# Patient Record
Sex: MALE | Race: WHITE | Employment: OTHER | URBAN - METROPOLITAN AREA
[De-identification: names, ages, dates, MRNs, and addresses within clinical notes are randomized per-mention and may not be internally consistent; named-entity substitution may affect disease eponyms.]

---

## 2017-03-09 PROBLEM — I10 ESSENTIAL HYPERTENSION: Status: ACTIVE | Noted: 2017-03-09

## 2017-04-03 PROBLEM — I42.1 HOCM (HYPERTROPHIC OBSTRUCTIVE CARDIOMYOPATHY) (HCC): Status: ACTIVE | Noted: 2017-04-03

## 2018-02-12 PROBLEM — R06.09 DYSPNEA ON EXERTION: Status: ACTIVE | Noted: 2018-02-12

## 2018-04-19 ENCOUNTER — HOSPITAL ENCOUNTER (OUTPATIENT)
Dept: LAB | Age: 68
Discharge: HOME | End: 2018-04-19
Payer: MEDICARE

## 2018-04-19 DIAGNOSIS — I42.1 HOCM (HYPERTROPHIC OBSTRUCTIVE CARDIOMYOPATHY) (HCC): ICD-10-CM

## 2018-04-19 LAB
ANION GAP SERPL CALC-SCNC: 9 MMOL/L (ref 7–16)
BASOPHILS # BLD: 0 K/UL (ref 0–0.2)
BASOPHILS NFR BLD: 1 % (ref 0–2)
BNP SERPL-MCNC: 17 PG/ML
BUN SERPL-MCNC: 17 MG/DL (ref 8–23)
CALCIUM SERPL-MCNC: 9.8 MG/DL (ref 8.3–10.4)
CHLORIDE SERPL-SCNC: 102 MMOL/L (ref 98–107)
CO2 SERPL-SCNC: 27 MMOL/L (ref 21–32)
CREAT SERPL-MCNC: 1 MG/DL (ref 0.8–1.5)
DIFFERENTIAL METHOD BLD: NORMAL
EOSINOPHIL # BLD: 0.1 K/UL (ref 0–0.8)
EOSINOPHIL NFR BLD: 2 % (ref 0.5–7.8)
ERYTHROCYTE [DISTWIDTH] IN BLOOD BY AUTOMATED COUNT: 14.1 % (ref 11.9–14.6)
GLUCOSE SERPL-MCNC: 87 MG/DL (ref 65–100)
HCT VFR BLD AUTO: 41.2 % (ref 41.1–50.3)
HGB BLD-MCNC: 14.2 G/DL (ref 13.6–17.2)
IMM GRANULOCYTES # BLD: 0 K/UL (ref 0–0.5)
IMM GRANULOCYTES NFR BLD AUTO: 0 % (ref 0–5)
INR PPP: 1
LYMPHOCYTES # BLD: 1.4 K/UL (ref 0.5–4.6)
LYMPHOCYTES NFR BLD: 30 % (ref 13–44)
MCH RBC QN AUTO: 29.5 PG (ref 26.1–32.9)
MCHC RBC AUTO-ENTMCNC: 34.5 G/DL (ref 31.4–35)
MCV RBC AUTO: 85.5 FL (ref 79.6–97.8)
MONOCYTES # BLD: 0.3 K/UL (ref 0.1–1.3)
MONOCYTES NFR BLD: 7 % (ref 4–12)
NEUTS SEG # BLD: 2.7 K/UL (ref 1.7–8.2)
NEUTS SEG NFR BLD: 60 % (ref 43–78)
PLATELET # BLD AUTO: 182 K/UL (ref 150–450)
PMV BLD AUTO: 12 FL (ref 10.8–14.1)
POTASSIUM SERPL-SCNC: 4.4 MMOL/L (ref 3.5–5.1)
PROTHROMBIN TIME: 12.4 SEC (ref 11.5–14.5)
RBC # BLD AUTO: 4.82 M/UL (ref 4.23–5.67)
SODIUM SERPL-SCNC: 138 MMOL/L (ref 136–145)
WBC # BLD AUTO: 4.6 K/UL (ref 4.3–11.1)

## 2018-04-19 PROCEDURE — 36415 COLL VENOUS BLD VENIPUNCTURE: CPT | Performed by: INTERNAL MEDICINE

## 2018-04-19 PROCEDURE — 85610 PROTHROMBIN TIME: CPT | Performed by: INTERNAL MEDICINE

## 2018-04-19 PROCEDURE — 85025 COMPLETE CBC W/AUTO DIFF WBC: CPT | Performed by: INTERNAL MEDICINE

## 2018-04-19 PROCEDURE — 80048 BASIC METABOLIC PNL TOTAL CA: CPT | Performed by: INTERNAL MEDICINE

## 2018-04-19 PROCEDURE — 83880 ASSAY OF NATRIURETIC PEPTIDE: CPT | Performed by: INTERNAL MEDICINE

## 2018-04-20 RX ORDER — ASPIRIN 81 MG/1
81 TABLET ORAL DAILY
COMMUNITY

## 2018-04-20 RX ORDER — LACTOBACILLUS ACIDOPHILUS
CAPSULE ORAL DAILY
COMMUNITY

## 2018-04-20 NOTE — PROGRESS NOTES
Patient pre-assessment complete for [id number] poss with Dr Obed Michel scheduled for 18 at 9:30, arrival time 7:30. Patient verified using . Patient instructed to bring all home medications in labeled bottles on the day of procedure. NPO status reinforced. Patient informed to take a full dose aspirin 325mg  or 81 mg x 4 on the day of procedure. Instructed they can take all other medications excluding vitamins & supplements. Patient verbalizes understanding of all instructions & denies any questions at this time.

## 2018-04-24 ENCOUNTER — APPOINTMENT (OUTPATIENT)
Dept: CARDIAC CATH/INVASIVE PROCEDURES | Age: 68
End: 2018-04-24
Payer: MEDICARE

## 2018-04-24 ENCOUNTER — HOSPITAL ENCOUNTER (OUTPATIENT)
Dept: CARDIAC CATH/INVASIVE PROCEDURES | Age: 68
Discharge: HOME | End: 2018-04-24
Attending: INTERNAL MEDICINE | Admitting: INTERNAL MEDICINE
Payer: MEDICARE

## 2018-04-24 VITALS
DIASTOLIC BLOOD PRESSURE: 55 MMHG | RESPIRATION RATE: 16 BRPM | TEMPERATURE: 98.4 F | OXYGEN SATURATION: 96 % | SYSTOLIC BLOOD PRESSURE: 100 MMHG | HEART RATE: 65 BPM | WEIGHT: 202 LBS | HEIGHT: 72 IN | BODY MASS INDEX: 27.36 KG/M2

## 2018-04-24 LAB
ACT BLD: 219 SECS (ref 70–128)
ATRIAL RATE: 63 BPM
CALCULATED P AXIS, ECG09: 71 DEGREES
CALCULATED R AXIS, ECG10: 5 DEGREES
CALCULATED T AXIS, ECG11: 51 DEGREES
DIAGNOSIS, 93000: NORMAL
P-R INTERVAL, ECG05: 178 MS
Q-T INTERVAL, ECG07: 436 MS
QRS DURATION, ECG06: 104 MS
QTC CALCULATION (BEZET), ECG08: 446 MS
VENTRICULAR RATE, ECG03: 63 BPM

## 2018-04-24 PROCEDURE — 74011250636 HC RX REV CODE- 250/636

## 2018-04-24 PROCEDURE — 93005 ELECTROCARDIOGRAM TRACING: CPT | Performed by: INTERNAL MEDICINE

## 2018-04-24 PROCEDURE — 93458 L HRT ARTERY/VENTRICLE ANGIO: CPT

## 2018-04-24 PROCEDURE — 93571 IV DOP VEL&/PRESS C FLO 1ST: CPT

## 2018-04-24 PROCEDURE — 74011000250 HC RX REV CODE- 250: Performed by: INTERNAL MEDICINE

## 2018-04-24 PROCEDURE — 77030015766

## 2018-04-24 PROCEDURE — 74011250636 HC RX REV CODE- 250/636: Performed by: INTERNAL MEDICINE

## 2018-04-24 PROCEDURE — C1769 GUIDE WIRE: HCPCS

## 2018-04-24 PROCEDURE — C1894 INTRO/SHEATH, NON-LASER: HCPCS

## 2018-04-24 PROCEDURE — C1887 CATHETER, GUIDING: HCPCS

## 2018-04-24 PROCEDURE — 74011636320 HC RX REV CODE- 636/320: Performed by: INTERNAL MEDICINE

## 2018-04-24 PROCEDURE — 77030019569 HC BND COMPR RAD TERU -B

## 2018-04-24 PROCEDURE — 93312 ECHO TRANSESOPHAGEAL: CPT

## 2018-04-24 PROCEDURE — 99153 MOD SED SAME PHYS/QHP EA: CPT

## 2018-04-24 PROCEDURE — 99152 MOD SED SAME PHYS/QHP 5/>YRS: CPT

## 2018-04-24 PROCEDURE — 85347 COAGULATION TIME ACTIVATED: CPT

## 2018-04-24 RX ORDER — LIDOCAINE HYDROCHLORIDE 20 MG/ML
60-140 INJECTION, SOLUTION INFILTRATION; PERINEURAL ONCE
Status: COMPLETED | OUTPATIENT
Start: 2018-04-24 | End: 2018-04-24

## 2018-04-24 RX ORDER — ACETAMINOPHEN 325 MG/1
650 TABLET ORAL
Status: CANCELLED | OUTPATIENT
Start: 2018-04-24

## 2018-04-24 RX ORDER — LIDOCAINE HYDROCHLORIDE 20 MG/ML
15 SOLUTION OROPHARYNGEAL AS NEEDED
Status: DISCONTINUED | OUTPATIENT
Start: 2018-04-24 | End: 2018-04-24 | Stop reason: HOSPADM

## 2018-04-24 RX ORDER — MIDAZOLAM HYDROCHLORIDE 1 MG/ML
.5-2 INJECTION, SOLUTION INTRAMUSCULAR; INTRAVENOUS
Status: DISCONTINUED | OUTPATIENT
Start: 2018-04-24 | End: 2018-04-24 | Stop reason: HOSPADM

## 2018-04-24 RX ORDER — SODIUM CHLORIDE 0.9 % (FLUSH) 0.9 %
5-10 SYRINGE (ML) INJECTION AS NEEDED
Status: CANCELLED | OUTPATIENT
Start: 2018-04-24

## 2018-04-24 RX ORDER — FENTANYL CITRATE 50 UG/ML
25-50 INJECTION, SOLUTION INTRAMUSCULAR; INTRAVENOUS
Status: DISCONTINUED | OUTPATIENT
Start: 2018-04-24 | End: 2018-04-24 | Stop reason: HOSPADM

## 2018-04-24 RX ORDER — SODIUM CHLORIDE 0.9 % (FLUSH) 0.9 %
5-10 SYRINGE (ML) INJECTION EVERY 8 HOURS
Status: CANCELLED | OUTPATIENT
Start: 2018-04-24

## 2018-04-24 RX ORDER — ONDANSETRON 2 MG/ML
4 INJECTION INTRAMUSCULAR; INTRAVENOUS
Status: CANCELLED | OUTPATIENT
Start: 2018-04-24

## 2018-04-24 RX ORDER — DIAZEPAM 5 MG/1
5 TABLET ORAL ONCE
Status: DISCONTINUED | OUTPATIENT
Start: 2018-04-24 | End: 2018-04-24 | Stop reason: HOSPADM

## 2018-04-24 RX ORDER — HYDROCODONE BITARTRATE AND ACETAMINOPHEN 5; 325 MG/1; MG/1
1 TABLET ORAL
Status: CANCELLED | OUTPATIENT
Start: 2018-04-24

## 2018-04-24 RX ORDER — SODIUM CHLORIDE 9 MG/ML
75 INJECTION, SOLUTION INTRAVENOUS CONTINUOUS
Status: CANCELLED | OUTPATIENT
Start: 2018-04-24

## 2018-04-24 RX ORDER — GUAIFENESIN 100 MG/5ML
81-324 LIQUID (ML) ORAL ONCE
Status: DISCONTINUED | OUTPATIENT
Start: 2018-04-24 | End: 2018-04-24 | Stop reason: HOSPADM

## 2018-04-24 RX ORDER — SODIUM CHLORIDE 9 MG/ML
75 INJECTION, SOLUTION INTRAVENOUS CONTINUOUS
Status: DISCONTINUED | OUTPATIENT
Start: 2018-04-24 | End: 2018-04-24 | Stop reason: HOSPADM

## 2018-04-24 RX ORDER — HEPARIN SODIUM 200 [USP'U]/100ML
3 INJECTION, SOLUTION INTRAVENOUS CONTINUOUS
Status: DISCONTINUED | OUTPATIENT
Start: 2018-04-24 | End: 2018-04-24 | Stop reason: HOSPADM

## 2018-04-24 RX ORDER — HEPARIN SODIUM 10000 [USP'U]/ML
1000-10000 INJECTION, SOLUTION INTRAVENOUS; SUBCUTANEOUS
Status: DISCONTINUED | OUTPATIENT
Start: 2018-04-24 | End: 2018-04-24 | Stop reason: HOSPADM

## 2018-04-24 RX ADMIN — HEPARIN SODIUM 3 ML/HR: 5000 INJECTION, SOLUTION INTRAVENOUS; SUBCUTANEOUS at 11:36

## 2018-04-24 RX ADMIN — LIDOCAINE HYDROCHLORIDE 80 MG: 20 INJECTION, SOLUTION INFILTRATION; PERINEURAL at 11:36

## 2018-04-24 RX ADMIN — FENTANYL CITRATE 50 MCG: 50 INJECTION, SOLUTION INTRAMUSCULAR; INTRAVENOUS at 09:30

## 2018-04-24 RX ADMIN — MIDAZOLAM HYDROCHLORIDE 1 MG: 1 INJECTION, SOLUTION INTRAMUSCULAR; INTRAVENOUS at 09:33

## 2018-04-24 RX ADMIN — VERAPAMIL HYDROCHLORIDE 2 ML: 2.5 INJECTION INTRAVENOUS at 11:36

## 2018-04-24 RX ADMIN — IOPAMIDOL 130 ML: 755 INJECTION, SOLUTION INTRAVENOUS at 12:00

## 2018-04-24 RX ADMIN — HEPARIN SODIUM 6000 UNITS: 10000 INJECTION, SOLUTION INTRAVENOUS; SUBCUTANEOUS at 11:42

## 2018-04-24 RX ADMIN — FENTANYL CITRATE 25 MCG: 50 INJECTION, SOLUTION INTRAMUSCULAR; INTRAVENOUS at 11:35

## 2018-04-24 RX ADMIN — LIDOCAINE HYDROCHLORIDE 15 ML: 20 SOLUTION ORAL; TOPICAL at 09:00

## 2018-04-24 RX ADMIN — MIDAZOLAM HYDROCHLORIDE 2 MG: 1 INJECTION, SOLUTION INTRAMUSCULAR; INTRAVENOUS at 09:30

## 2018-04-24 RX ADMIN — MIDAZOLAM HYDROCHLORIDE 2 MG: 1 INJECTION, SOLUTION INTRAMUSCULAR; INTRAVENOUS at 11:35

## 2018-04-24 RX ADMIN — NITROGLYCERIN 0.2 MG: 200 INJECTION, SOLUTION INTRAVENOUS at 11:54

## 2018-04-24 NOTE — PROGRESS NOTES
TRANSFER - OUT REPORT:    R radial diagnostic [facility] with Dr Margaret Frost  Pressure wire study negative  Heparin 6000 units  Versed 2mg   Fentanyl 25mcg  TR band 14ml at 1200  No bleeding or hematoma noted at site    Verbal report given to Jodi(name) on Vanessa Sin.  being transferred to CPRU(unit) for routine progression of care       Report consisted of patients Situation, Background, Assessment and   Recommendations(SBAR). Information from the following report(s) Procedure Summary was reviewed with the receiving nurse. Lines:   Peripheral IV 04/24/18 Right Antecubital (Active)       Peripheral IV 04/24/18 Left Antecubital (Active)        Opportunity for questions and clarification was provided.       Patient transported with:   Registered Nurse

## 2018-04-24 NOTE — PROCEDURES
Brief Cardiac Procedure Note    Patient: Jessica Brown. MRN: [id number]  SSN: [id number]    YOB: 1950  Age: 79 y.o. Sex: male      Date of Procedure: 4/24/2018     Pre-procedure Diagnosis: Shortness of Breath    Post-procedure Diagnosis: Coronary Artery Disease and HOCM    Procedure: Left Heart Catheterization. IFR of RCA    Brief Description of Procedure: As above    Performed By: Carolina Gordillo MD     Assistants: None    Anesthesia: Moderate Sedation    Estimated Blood Loss: Less than 10 mL      Specimens: None    Implants: None    Findings: Moderate RCA disease. IFR 0.93. HOCM with post PVC . Complications: None    Recommendations: Continue medical therapy.     Signed By: Carolina Gordillo MD     April 24, 2018

## 2018-04-24 NOTE — PROGRESS NOTES
Patient received to [address] [place] [address] # 11  Ambulatory from [facility]. Patient scheduled for ARJUN/LHC today with Dr Trey Bunch. Procedure reviewed & questions answered, voiced good understanding consent obtained & placed on chart. All medications and medical history reviewed. Will prep patient per orders. Patient & family updated on plan of care. The patient has a fraility score of 3-MANAGING WELL, based on patient's ability to perform ADL's independently, patient  A&Ox3, patient able to ambulate to prep room without difficulty.

## 2018-04-24 NOTE — PROCEDURES
[facility]    Ade Shepard  MR#: [id number]  : 1950  ACCOUNT #: [de-identified]   DATE OF SERVICE: 2018    PROCEDURE:    1. Left heart catheterization, selective coronary arteriography, left ventriculogram via the right radial approach. 2.  IFR of RCA. INDICATION:    1. This patient with severe limiting dyspnea with exertion and arm discomfort concerning for typical angina. He also has a history of possible hypertrophic obstructive cardiomyopathy and aortic stenosis. Cardiac catheterization recommended by Dr. Brittney Anguiano. 2.  Eccentric stenosis of the mid RCA. Hemodynamic assessment felt indicated to assess need for percutaneous revascularization. DESCRIPTION OF PROCEDURE:  After informed consent was obtained, patient brought to cardiac catheterization lab. The right radial artery was prepped and draped in usual sterile fashion. Utilizing a modified Seldinger technique and micropuncture needle, the right radial artery was entered. A 6-[place] Terumo slender sheath was placed without difficulty. A radial cocktail consisting of 2000 units of heparin, 2 mg verapamil and 200 mcg of nitroglycerin was administered. A 5-[place] Tiger 4.0 catheter was used to selectively engage the ostium of left main coronary artery and right coronary artery respectively. Selective injection measurements were performed. At this point, the Tiger catheter was exchanged over a long exchange wire for a [place] dual lumen catheter. Simultaneous LV and aortic pressure was obtained, in the mid LV, LVOT and post-PVC. Following hemodynamic assessment, left ventriculogram was obtained. At this point, Pullback gradients across the aortic valve was obtained, ensuring that the [place] was appropriately normalized. There was an eccentric stenosis of the right coronary artery and it is felt hemodynamic assessment was required.   At this point, patient given 6000 units of heparin. ACT was 219. A JR4 guide was used to selectively engage the ostium of the right coronary artery. A Veratta pressure wire system was then advanced into the ostium of the right coronary artery and appropriately normalized. Following appropriate normalization, the wire was placed in the distal RCA after 200 mcg intracoronary nitroglycerin was given. IFR was measured at 0.93. This indicated hemodynamically insignificant stenosis. Upon pullback of the lesion, it was noted the wire was appropriately normalized. At the conclusion of procedure, the sheath was removed and a pneumatic band was placed with excellent hemostasis. No complications were encountered. SEDATION:  The patient received moderate sedation with 2 mg of Versed and 25 mcg of fentanyl. Sedation start time was 11:26 a.m., with procedure complete time of 11:59 a.m. Sedation was administered by Jakob Martinez RN under my supervision. No complications were encountered. CONTRAST:  Isovue 130. HEMODYNAMIC RESULTS. 1.  Aortic pressure 94/62. 2.  Left ventricular end diastolic pressure 18. 3.  The mean gradient across the aortic valve and the left ventricular outflow tract was approximately 11 mmHg. At baseline in the mid LV, the mean gradient was 20. Post-PVC, the mean gradient increased to 103 mmHg. These findings were consistent with hypertrophic obstructive cardiomyopathy with dynamic LVOT obstruction. ANGIOGRAPHIC RESULTS:    1. Left main coronary artery: Moderately calcified. A 30% distal stenosis. 2.  LAD:  This is a medium caliber vessel. Has a 10% mid luminal irregularities. The first septal artery is a medium caliber vessel and is patent. 3.  Ramus intermediate is a medium caliber vessel, patent. 4.  Left circumflex:  Medium caliber vessel. A 20% mid and 20% distal stenosis. 5.  First obtuse marginal artery:  Medium caliber vessels were patent.   6.  Right coronary artery is a medium caliber dominant vessel. Moderately calcified. There is an eccentric 50% mid stenosis. Distal vessel was patent. 7.  Right PDA:  Small to medium caliber vessels were patent. 8.  Right posterolateral branch:  Medium to large caliber vessel, patent. 9.  Left ventriculogram performed in PAUL projection shows normal LV systolic function, EF 53% to 70%. 10.  IFR of the right coronary artery was 0.93, indicating hemodynamically insignificant stenosis. CONCLUSIONS:  1.  Moderate calcific coronary artery disease. 2.  Hemodynamically insignificant stenosis of the right coronary artery with IFR measuring from 10.93.  3.  Normal left ventricular systolic function. 4.  Findings consistent with hypertrophic obstructive cardiomyopathy with a mean gradient at baseline of 20 mmHg in the mid left ventricular outflow tract, which increased to a mean gradient of 103 mmHg following premature ventricular contraction. There is likely very mild aortic stenosis as a mean gradient of 11 was recorded at the left ventricular outflow tract at baseline. 5.  Normal left ventricular LV systolic function. PLAN:  Patient has an appointment for evaluation at tertiary [facility] center for consideration of alcohol septal ablation.       MD MENDEZ Martinez / JF  D: 04/24/2018 12:17     T: 04/24/2018 13:13  JOB #: [id number]

## 2018-04-24 NOTE — PROGRESS NOTES
Report received from Logan Newman RN. Procedural findings communicated. Intra procedural  medication administration reviewed. Progression of care discussed. Patient received into [address] 8 post ARJUN.     Routine post procedural vital signs and  assessment initiated yes

## 2018-04-24 NOTE — PROGRESS NOTES
Report received from [address] [place] [address]. Procedural findings communicated. Intra procedural  medication administration reviewed. Progression of care discussed.      Patient received into [address] [facility] 8 post sheath removal.     Access site without bleeding or swelling yes    Dressing dry and intact yes    Patient instructed to limit movement to right upper extremity    Routine post procedural vital signs and site assessment initiated yes

## 2018-04-24 NOTE — IP AVS SNAPSHOT
[address] [facility] 59  [phone]     Patient: Arcadio Asher. MRN: [id number]  TNI:[id number]/[id number]                Discharge Summary   4/24/2018    Arcadio Asher. MRN[de-identified]  [id number]           Admission Information     Provider Pager Service Admission Date Expected D/C Date    Olu Vasquez MD  CARDIAC CATH LAB 4/24/2018     Actual LOS Patient Class             0 days OUTPATIENT               Follow-up Information     Follow up With Details Comments 2629 RAIZA Ritchie MD On 5/8/2018 Follow-up @ 11:15am in [address] [place] [address]  [phone]           My Medications      ASK your physician about these medications        Instructions Each Dose to Equal    Morning Noon Evening Bedtime    aspirin delayed-release 81 mg tablet       Your last dose was: Your next dose is: Take 81 mg by mouth daily. 81 mg                        CENTRUM PO       Your last dose was: Your next dose is: Take  by mouth daily. cpap machine kit       Your last dose was: Your next dose is:              by Does Not Apply route. ibuprofen 600 mg tablet   Commonly known as:  MOTRIN       Your last dose was: Your next dose is: Take 1 Tab by mouth every six (6) hours as needed for Pain. 600 mg                        MEGARED OMEGA-3 KRILL OIL 1,000-230-60 mg Cap   Generic drug:  krill-om-3-dha-epa-phospho-ast       Your last dose was: Your next dose is: Take  by mouth daily. metoprolol succinate 100 mg tablet   Commonly known as:  TOPROL-XL       Your last dose was: Your next dose is: Take 100 mg by mouth daily. 100 mg                        omeprazole 40 mg capsule   Commonly known as:  PRILOSEC       Your last dose was:          Your next dose is:              Take 40 mg by mouth daily as needed. 40 mg                        simvastatin 40 mg tablet   Commonly known as:  ZOCOR       Your last dose was: Your next dose is: Take 0.5 Tabs by mouth nightly. 20 mg                                     General Information            Please provide this summary of care documentation to your next provider. Allergies     No Known Allergies      Current Immunizations  Reviewed on 10/2/2017    Name Date    Influenza High Dose Vaccine PF 11/29/2017    Influenza Vaccine 11/8/2016, 9/28/2015    Pneumococcal Conjugate (PCV-13) 11/29/2017    Td 10/22/2015, 1/1/1993    Zoster Vaccine, Live 5/1/2014           Discharge Instructions             Discharge Instructions       HEART CATHETERIZATION/ANGIOGRAPHY DISCHARGE INSTRUCTIONS    1. Check puncture site frequently for swelling or bleeding. If there is any bleeding, lie down and apply pressure over the area with a clean towel or washcloth. Notify your doctor for any redness, swelling, drainage, or oozing from the puncture site. Notify your doctor for any fever or chills. 2. If the extremity becomes cold, numb, or painful call [facility] at [phone].  3. Activity should be limited for the next 48 hours. Climb stairs as little as possible and avoid any stooping, bending, or strenuous activity for 48 hours. No heavy lifting (anything over 10 pounds) for 3 days. 4. You may resume your usual diet. Drink more fluids than usual.  5. Have a responsible person drive you home and stay with you for at least 24 hours after your heart catheterization/angiography. 6. You may remove bandage from your Right wrist in 24 hours. You may shower in 24 hours. No tub baths, hot tubs, or swimming for 1 week. Do not place any lotions, creams, powders, or ointments over puncture site for 1 week. You may place a clean band-aid over the puncture site each day for 5 days. Change daily.   I have read the above instructions and have had the opportunity to ask questions.       Patient: ________________________   Date: 4/24/2018    Witness: _______________________   Date: 4/24/2018    Discharge Orders     None      `                 Patient Signature:  ____________________________________________________________    Date:  ____________________________________________________________       `           Provider Signature:  ____________________________________________________________    Date:  ____________________________________________________________

## 2018-04-24 NOTE — PROCEDURES
Brief Cardiac Procedure Note    Patient: Binta Osborne MRN: [id number]  SSN: [id number]    YOB: 1950  Age: 79 y.o. Sex: male      Date of Procedure: 4/24/2018     Pre-procedure Diagnosis: Aortic Stenosis    Post-procedure Diagnosis: Aortic Stenosis    Procedure: Transesophageal Echocardiogram    Brief Description of Procedure: As above    Performed By: Turner Brenner MD     Assistants: None    Anesthesia: Moderate Sedation    Estimated Blood Loss: Less than 10 mL      Specimens: None    Implants: None    Findings: minimal Aortic stenosis. Trileaflet valve. Complications: None    Recommendations: Continue medical therapy.     Signed By: Turner Brenner MD     April 24, 2018

## 2018-05-08 PROBLEM — I25.83 CORONARY ARTERY DISEASE DUE TO LIPID RICH PLAQUE: Status: ACTIVE | Noted: 2018-05-08

## 2018-05-08 PROBLEM — I25.10 CORONARY ARTERY DISEASE DUE TO LIPID RICH PLAQUE: Status: ACTIVE | Noted: 2018-05-08

## 2019-10-30 ENCOUNTER — HOSPITAL ENCOUNTER (OUTPATIENT)
Dept: MAMMOGRAPHY | Age: 69
Discharge: HOME | End: 2019-10-30
Attending: INTERNAL MEDICINE
Payer: MEDICARE

## 2019-10-30 DIAGNOSIS — N62 GYNECOMASTIA: ICD-10-CM

## 2019-10-30 DIAGNOSIS — N62 GYNECOMASTIA, MALE: ICD-10-CM

## 2019-10-30 PROCEDURE — 77066 DX MAMMO INCL CAD BI: CPT

## 2019-10-30 PROCEDURE — 76642 ULTRASOUND BREAST LIMITED: CPT

## 2019-10-31 ENCOUNTER — HOSPITAL ENCOUNTER (OUTPATIENT)
Dept: ULTRASOUND IMAGING | Age: 69
Discharge: HOME | End: 2019-10-31
Attending: INTERNAL MEDICINE
Payer: MEDICARE

## 2019-10-31 ENCOUNTER — HOSPITAL ENCOUNTER (OUTPATIENT)
Dept: MRI IMAGING | Age: 69
Discharge: HOME | End: 2019-10-31
Attending: INTERNAL MEDICINE
Payer: MEDICARE

## 2019-10-31 DIAGNOSIS — M54.16 LUMBAR RADICULOPATHY: ICD-10-CM

## 2019-10-31 DIAGNOSIS — R20.0 NUMBNESS OF FOOT: ICD-10-CM

## 2019-10-31 DIAGNOSIS — Z13.6 SCREENING FOR AAA (ABDOMINAL AORTIC ANEURYSM): ICD-10-CM

## 2019-10-31 DIAGNOSIS — M43.16 SPONDYLOLISTHESIS OF LUMBAR REGION: ICD-10-CM

## 2019-10-31 PROCEDURE — 72148 MRI LUMBAR SPINE W/O DYE: CPT

## 2019-10-31 PROCEDURE — 76706 US ABDL AORTA SCREEN AAA: CPT

## 2020-01-30 PROBLEM — G47.30 SLEEP APNEA: Status: ACTIVE | Noted: 2020-01-30

## 2020-03-02 PROBLEM — R94.39 ABNORMAL STRESS ECHO: Status: ACTIVE | Noted: 2020-03-02

## 2020-03-06 ENCOUNTER — HOSPITAL ENCOUNTER (OUTPATIENT)
Dept: LAB | Age: 70
Discharge: HOME | End: 2020-03-06
Payer: MEDICARE

## 2020-03-06 DIAGNOSIS — I25.10 CORONARY ARTERY DISEASE DUE TO LIPID RICH PLAQUE: ICD-10-CM

## 2020-03-06 DIAGNOSIS — I25.83 CORONARY ARTERY DISEASE DUE TO LIPID RICH PLAQUE: ICD-10-CM

## 2020-03-06 DIAGNOSIS — R94.39 ABNORMAL STRESS ECHO: ICD-10-CM

## 2020-03-06 DIAGNOSIS — I25.9 CHEST PAIN DUE TO MYOCARDIAL ISCHEMIA, UNSPECIFIED ISCHEMIC CHEST PAIN TYPE: ICD-10-CM

## 2020-03-06 LAB
ANION GAP SERPL CALC-SCNC: 3 MMOL/L (ref 7–16)
BASOPHILS # BLD: 0 K/UL (ref 0–0.2)
BASOPHILS NFR BLD: 1 % (ref 0–2)
BUN SERPL-MCNC: 13 MG/DL (ref 8–23)
CALCIUM SERPL-MCNC: 8.9 MG/DL (ref 8.3–10.4)
CHLORIDE SERPL-SCNC: 107 MMOL/L (ref 98–107)
CO2 SERPL-SCNC: 29 MMOL/L (ref 21–32)
CREAT SERPL-MCNC: 1 MG/DL (ref 0.8–1.5)
DIFFERENTIAL METHOD BLD: ABNORMAL
EOSINOPHIL # BLD: 0.2 K/UL (ref 0–0.8)
EOSINOPHIL NFR BLD: 4 % (ref 0.5–7.8)
ERYTHROCYTE [DISTWIDTH] IN BLOOD BY AUTOMATED COUNT: 14 % (ref 11.9–14.6)
GLUCOSE SERPL-MCNC: 90 MG/DL (ref 65–100)
HCT VFR BLD AUTO: 39.3 % (ref 41.1–50.3)
HGB BLD-MCNC: 13.4 G/DL (ref 13.6–17.2)
IMM GRANULOCYTES # BLD AUTO: 0 K/UL (ref 0–0.5)
IMM GRANULOCYTES NFR BLD AUTO: 0 % (ref 0–5)
LYMPHOCYTES # BLD: 1.5 K/UL (ref 0.5–4.6)
LYMPHOCYTES NFR BLD: 32 % (ref 13–44)
MCH RBC QN AUTO: 29.3 PG (ref 26.1–32.9)
MCHC RBC AUTO-ENTMCNC: 34.1 G/DL (ref 31.4–35)
MCV RBC AUTO: 85.8 FL (ref 79.6–97.8)
MONOCYTES # BLD: 0.4 K/UL (ref 0.1–1.3)
MONOCYTES NFR BLD: 10 % (ref 4–12)
NEUTS SEG # BLD: 2.5 K/UL (ref 1.7–8.2)
NEUTS SEG NFR BLD: 54 % (ref 43–78)
NRBC # BLD: 0 K/UL (ref 0–0.2)
PLATELET # BLD AUTO: 180 K/UL (ref 150–450)
PMV BLD AUTO: 12.4 FL (ref 9.4–12.3)
POTASSIUM SERPL-SCNC: 4.2 MMOL/L (ref 3.5–5.1)
RBC # BLD AUTO: 4.58 M/UL (ref 4.23–5.6)
SODIUM SERPL-SCNC: 139 MMOL/L (ref 136–145)
WBC # BLD AUTO: 4.6 K/UL (ref 4.3–11.1)

## 2020-03-06 PROCEDURE — 80048 BASIC METABOLIC PNL TOTAL CA: CPT

## 2020-03-06 PROCEDURE — 36415 COLL VENOUS BLD VENIPUNCTURE: CPT

## 2020-03-06 PROCEDURE — 85025 COMPLETE CBC W/AUTO DIFF WBC: CPT

## 2020-03-13 NOTE — PROGRESS NOTES
Patient pre-assessment complete for [facility] poss with Dr Hilario Saul scheduled for 3/16/20 at 11am, arrival time 9am. Patient verified using . Patient instructed to bring all home medications in labeled bottles on the day of procedure. NPO status reinforced. Patient informed to take a full dose aspirin 325mg  or 81 mg x 4 on the day of procedure. Instructed they can take all other medications excluding vitamins & supplements. Patient verbalizes understanding of all instructions & denies any questions at this time.

## 2020-03-16 ENCOUNTER — HOSPITAL ENCOUNTER (OUTPATIENT)
Dept: CARDIAC CATH/INVASIVE PROCEDURES | Age: 70
Discharge: HOME | End: 2020-03-16
Attending: INTERNAL MEDICINE | Admitting: INTERNAL MEDICINE
Payer: MEDICARE

## 2020-03-16 VITALS
WEIGHT: 210 LBS | RESPIRATION RATE: 14 BRPM | OXYGEN SATURATION: 94 % | DIASTOLIC BLOOD PRESSURE: 79 MMHG | HEART RATE: 66 BPM | HEIGHT: 72 IN | SYSTOLIC BLOOD PRESSURE: 119 MMHG | BODY MASS INDEX: 28.44 KG/M2

## 2020-03-16 LAB
ACT BLD: 224 SECS (ref 70–128)
ATRIAL RATE: 66 BPM
CALCULATED P AXIS, ECG09: 52 DEGREES
CALCULATED R AXIS, ECG10: 49 DEGREES
CALCULATED T AXIS, ECG11: 33 DEGREES
DIAGNOSIS, 93000: NORMAL
P-R INTERVAL, ECG05: 180 MS
Q-T INTERVAL, ECG07: 436 MS
QRS DURATION, ECG06: 138 MS
QTC CALCULATION (BEZET), ECG08: 457 MS
VENTRICULAR RATE, ECG03: 66 BPM

## 2020-03-16 PROCEDURE — 85347 COAGULATION TIME ACTIVATED: CPT

## 2020-03-16 PROCEDURE — 74011636320 HC RX REV CODE- 636/320: Performed by: INTERNAL MEDICINE

## 2020-03-16 PROCEDURE — C1769 GUIDE WIRE: HCPCS

## 2020-03-16 PROCEDURE — 77030016699 HC CATH ANGI DX INFN1 CARD -A

## 2020-03-16 PROCEDURE — 99153 MOD SED SAME PHYS/QHP EA: CPT

## 2020-03-16 PROCEDURE — 74011250636 HC RX REV CODE- 250/636: Performed by: INTERNAL MEDICINE

## 2020-03-16 PROCEDURE — 93571 IV DOP VEL&/PRESS C FLO 1ST: CPT

## 2020-03-16 PROCEDURE — C1887 CATHETER, GUIDING: HCPCS

## 2020-03-16 PROCEDURE — 74011000250 HC RX REV CODE- 250: Performed by: INTERNAL MEDICINE

## 2020-03-16 PROCEDURE — 93005 ELECTROCARDIOGRAM TRACING: CPT | Performed by: INTERNAL MEDICINE

## 2020-03-16 PROCEDURE — C1894 INTRO/SHEATH, NON-LASER: HCPCS

## 2020-03-16 PROCEDURE — 99152 MOD SED SAME PHYS/QHP 5/>YRS: CPT

## 2020-03-16 PROCEDURE — 93458 L HRT ARTERY/VENTRICLE ANGIO: CPT

## 2020-03-16 PROCEDURE — 77030015766

## 2020-03-16 PROCEDURE — 77030029997 HC DEV COM RDL R BND TELE -B

## 2020-03-16 RX ORDER — ACETAMINOPHEN 325 MG/1
650 TABLET ORAL
Status: CANCELLED | OUTPATIENT
Start: 2020-03-16

## 2020-03-16 RX ORDER — HEPARIN SODIUM 200 [USP'U]/100ML
3 INJECTION, SOLUTION INTRAVENOUS CONTINUOUS
Status: DISCONTINUED | OUTPATIENT
Start: 2020-03-16 | End: 2020-03-16 | Stop reason: HOSPADM

## 2020-03-16 RX ORDER — GUAIFENESIN 100 MG/5ML
81-324 LIQUID (ML) ORAL ONCE
Status: CANCELLED | OUTPATIENT
Start: 2020-03-16 | End: 2020-03-16

## 2020-03-16 RX ORDER — ONDANSETRON 2 MG/ML
4 INJECTION INTRAMUSCULAR; INTRAVENOUS
Status: CANCELLED | OUTPATIENT
Start: 2020-03-16 | End: 2020-03-17

## 2020-03-16 RX ORDER — SODIUM CHLORIDE 0.9 % (FLUSH) 0.9 %
5-40 SYRINGE (ML) INJECTION EVERY 8 HOURS
Status: CANCELLED | OUTPATIENT
Start: 2020-03-16

## 2020-03-16 RX ORDER — HEPARIN SODIUM 10000 [USP'U]/ML
5000 INJECTION, SOLUTION INTRAVENOUS; SUBCUTANEOUS
Status: DISCONTINUED | OUTPATIENT
Start: 2020-03-16 | End: 2020-03-16 | Stop reason: HOSPADM

## 2020-03-16 RX ORDER — SODIUM CHLORIDE 9 MG/ML
75 INJECTION, SOLUTION INTRAVENOUS CONTINUOUS
Status: DISCONTINUED | OUTPATIENT
Start: 2020-03-16 | End: 2020-03-16 | Stop reason: HOSPADM

## 2020-03-16 RX ORDER — MIDAZOLAM HYDROCHLORIDE 1 MG/ML
.5-2 INJECTION, SOLUTION INTRAMUSCULAR; INTRAVENOUS
Status: DISCONTINUED | OUTPATIENT
Start: 2020-03-16 | End: 2020-03-16 | Stop reason: HOSPADM

## 2020-03-16 RX ORDER — LIDOCAINE HYDROCHLORIDE 10 MG/ML
2-20 INJECTION, SOLUTION EPIDURAL; INFILTRATION; INTRACAUDAL; PERINEURAL ONCE
Status: COMPLETED | OUTPATIENT
Start: 2020-03-16 | End: 2020-03-16

## 2020-03-16 RX ORDER — FENTANYL CITRATE 50 UG/ML
25-50 INJECTION, SOLUTION INTRAMUSCULAR; INTRAVENOUS
Status: DISCONTINUED | OUTPATIENT
Start: 2020-03-16 | End: 2020-03-16 | Stop reason: HOSPADM

## 2020-03-16 RX ORDER — HYDROCODONE BITARTRATE AND ACETAMINOPHEN 5; 325 MG/1; MG/1
1 TABLET ORAL
Status: CANCELLED | OUTPATIENT
Start: 2020-03-16

## 2020-03-16 RX ORDER — SODIUM CHLORIDE 0.9 % (FLUSH) 0.9 %
5-40 SYRINGE (ML) INJECTION AS NEEDED
Status: CANCELLED | OUTPATIENT
Start: 2020-03-16

## 2020-03-16 RX ORDER — SODIUM CHLORIDE 9 MG/ML
75 INJECTION, SOLUTION INTRAVENOUS CONTINUOUS
Status: CANCELLED | OUTPATIENT
Start: 2020-03-16

## 2020-03-16 RX ADMIN — IOPAMIDOL 110 ML: 755 INJECTION, SOLUTION INTRAVENOUS at 11:21

## 2020-03-16 RX ADMIN — HEPARIN SODIUM 3 ML/HR: 5000 INJECTION, SOLUTION INTRAVENOUS; SUBCUTANEOUS at 11:04

## 2020-03-16 RX ADMIN — HEPARIN SODIUM 2 ML: 10000 INJECTION, SOLUTION INTRAVENOUS; SUBCUTANEOUS at 11:04

## 2020-03-16 RX ADMIN — LIDOCAINE HYDROCHLORIDE 3 ML: 10 INJECTION, SOLUTION EPIDURAL; INFILTRATION; INTRACAUDAL; PERINEURAL at 11:04

## 2020-03-16 RX ADMIN — SODIUM CHLORIDE 75 ML/HR: 900 INJECTION, SOLUTION INTRAVENOUS at 09:38

## 2020-03-16 RX ADMIN — FENTANYL CITRATE 50 MCG: 0.05 INJECTION, SOLUTION INTRAMUSCULAR; INTRAVENOUS at 11:02

## 2020-03-16 RX ADMIN — HEPARIN SODIUM 5000 UNITS: 10000 INJECTION INTRAVENOUS; SUBCUTANEOUS at 11:12

## 2020-03-16 RX ADMIN — NITROGLYCERIN 0.2 MG: 200 INJECTION, SOLUTION INTRAVENOUS at 11:18

## 2020-03-16 RX ADMIN — MIDAZOLAM 2 MG: 1 INJECTION INTRAMUSCULAR; INTRAVENOUS at 11:02

## 2020-03-16 NOTE — PROCEDURES
[address] [facility]  CARDIAC CATH    Name:  Carolina Howard  MR#:  [id number]  :  1950  ACCOUNT #:  [de-identified]  DATE OF SERVICE:  2020    PROCEDURES PERFORMED:  1. Left heart catheterization, selective coronary arteriography, and left ventriculogram via the right radial approach. 2.  IFR of right coronary artery. PREOPERATIVE DIAGNOSES:  Exertional chest pain concerning for angina in a patient with known history of moderate nonobstructive coronary artery disease. Stress test concerning for anterior schema. POSTOPERATIVE DIAGNOSES:  Stable coronary artery disease with non-hemodynamically significant right coronary artery stenosis. SURGEON:  Joann Clifton MD    SURGICAL ASSISTANT:  None. ESTIMATED BLOOD LOSS:  Less than 5 mL. SPECIMENS REMOVED:  None. COMPLICATIONS:  None. IMPLANTS:  None. ANESTHESIA:  The patient received moderate supervised conscious sedation administered by Vernon Dee RN under my supervision. The patient received 2 mg Versed and 50 mcg fentanyl. Sedation start time was 11:01 p.m. with a procedure completion time of 11:30 p.m. FINDINGS:  As below. TECHNICAL FINDINGS:  After informed consent was obtained, the patient was brought to the cardiac catheterization lab. The right radial artery was prepped and draped in usual sterile fashion. Utilizing modified Seldinger technique and micropuncture needle, the right radial artery was entered. A 6-[place]izen of the [place] Terumo slender sheath was placed without difficulty. A radial cocktail consisting of 2000 units of heparin, 2 mg of verapamil, and 200 mcg of nitroglycerin was administered. A 5-[place]izen of [place] Tiger 4.0 catheter was used to select and engage the ostium of the left main coronary artery and right coronary artery respectively. Selective injection verification was performed. A pigtail catheter was used to cross the aortic valve and the left ventricle.   Hemodynamic measurements and left ventriculogram were obtained. Left ventricular aortic pressure gradient was obtained by pullback technique. At this point, the patient was given 5000 additional units of heparin. ACT was 224. A JR-4 guide was used to select and engage the ostium of the right coronary artery. The patient was given 200 mcg of intracoronary nitroglycerin and a Verrata pressure wire system was placed in the ostium of RCA and appropriately normalized. This was then placed in the distal RCA and iFR was measured at 0.95. This indicated that the mid RCA stenosis was not hemodynamically significant. Based on this, revascularization was deferred. At the conclusion of diagnostic procedure, the radial sheath was removed and a pneumatic band was placed with good hemostasis. No complications were encountered. CONTRAST:  Isovue 110. HEMODYNAMIC RESULTS:  1. Aortic pressure is 121/58 with a mean of 83.  2.  There was no significant gradient across the aortic valve. 3.  Left ventricular end-diastolic pressure was 22. ANGIOGRAPHIC RESULTS:  1. Left main coronary artery:  Large-caliber vessel. Contains moderate calcification in the mid distal vessel. The distal vessel contains 20-30% stenosis, but this is nonobstructive. 2.  LAD:  It is a medium-caliber vessel. 20% ostial stenosis. 20% mid stenosis. 3.  First diagonal artery: It is a medium-caliber vessel. 20-30% proximal stenosis. 4.  Ramus intermedius:  Small caliber vessel. 20% mid stenosis. 5.  Left circumflex:  Medium caliber and nondominant vessel. 20% proximal and 20% mid stenosis. 8.  First obtuse marginal:  Small-caliber vessel. Patent. 9.  Right coronary artery is a medium-caliber vessel. 20% proximal stenosis. There is an eccentric appearing 40-50% mid stenosis. The distal vessel contains 30% stenosis. 10.  Right PDA:  Small to medium-caliber vessel. Patent. 11.  Right posterolateral branch:  Medium-caliber vessel. Patent.   12.  Left ventriculogram performed in PAUL projection shows normal left ventricular systolic function. Mild calcification of the aortic valve and LVOT. Aortic root is nondilated. 13. IFR of RCA is 0.95. CONCLUSIONS:  1.  Mild nonobstructive coronary artery disease in the left circulation. 2.  Moderate nonobstructive disease in the RCA in the right coronary artery with normal IFR of the RCA. 3.  Normal left ventricular systolic function. PLAN:  Monitor the patient closely in postprocedure setting. Follow up with Dr. Ana M Delgado in 2 weeks for ongoing care. I do not see any targets for revascularization.       MD KATRINA [email or web address]  D:  03/16/2020 11:30  T:  03/16/2020 12:21  JOB #:  [id number]  CC:  Shauna Das MD

## 2020-03-16 NOTE — PROGRESS NOTES
Do you currently have any signs or symptoms of respiratory infection, such as fever, cough, shortness of breath, or sore throat? NO    In the last 14 days have you had contact with someone with confirmed or presumptive diagnosis of COVID-19 or someone under investigation of COVID-19? NO    In the last 14 days have you traveled or have someone in your home who has traveled to [place], [facility], [facility], [place], [place], [place], [place], or [place]?  NO

## 2020-03-16 NOTE — PROCEDURES
Brief Cardiac Procedure Note    Patient: Reinier Cano MRN: [id number]  SSN: [id number]    YOB: 1950  Age: 78 y.o. Sex: male      Date of Procedure: 3/16/2020     Pre-procedure Diagnosis: Typical Angina    Post-procedure Diagnosis: Coronary Artery Disease    Procedure: Left Heart Catheterization. IFR of RCA    Brief Description of Procedure: As above    Performed By: Harshad Barahona MD     Assistants: None    Anesthesia: Moderate Sedation    Estimated Blood Loss: Less than 10 mL      Specimens: None    Implants: None    Findings: STable CAD. IFR of RCA 0.95. Medical therapy. Complications: None    Recommendations: Continue medical therapy.     Signed By: Harshad Barahona MD     March 16, 2020

## 2020-03-16 NOTE — DISCHARGE INSTRUCTIONS

## 2020-03-16 NOTE — PROGRESS NOTES
TRANSFER - OUT REPORT:    Verbal report given to Brendon Jaffe RN(name) on Flavio Jade.  being transferred to CPRU(unit) for routine progression of care       Report consisted of patients Situation, Background, Assessment and   Recommendations(SBAR). Information from the following report(s) SBAR was reviewed with the receiving nurse.     [facility] w/ Dr. Aida Hawley  IFR the RCA  R radial  TR band 12 mls  2 mg versed  50 mcg fentanyl  5000 heparin   at 1121

## 2020-03-16 NOTE — PROGRESS NOTES
Patient received to [address] [place] [address] room # 11  Ambulatory from [facility]. Patient scheduled for lhc/poss today with Dr Rosendo Sotelo. Procedure reviewed & questions answered, voiced good understanding consent obtained & placed on chart. All medications and medical history reviewed. Will prep patient per orders. Patient & family updated on plan of care.       The patient has a fraility score of 3-MANAGING WELL, based on independent of adl's

## 2020-12-22 ENCOUNTER — HOSPITAL ENCOUNTER (OUTPATIENT)
Dept: LAB | Age: 70
Discharge: HOME | End: 2020-12-22
Payer: MEDICARE

## 2020-12-22 DIAGNOSIS — G25.81 RESTLESS LEGS SYNDROME (RLS): ICD-10-CM

## 2020-12-22 LAB — FERRITIN SERPL-MCNC: 46 NG/ML (ref 8–388)

## 2020-12-22 PROCEDURE — 82728 ASSAY OF FERRITIN: CPT

## 2020-12-22 PROCEDURE — 36415 COLL VENOUS BLD VENIPUNCTURE: CPT

## 2021-12-21 PROBLEM — G47.33 OSA ON CPAP: Status: ACTIVE | Noted: 2020-01-30

## 2021-12-21 PROBLEM — Z99.89 OSA ON CPAP: Status: ACTIVE | Noted: 2020-01-30

## 2022-03-19 PROBLEM — G47.33 OSA ON CPAP: Status: ACTIVE | Noted: 2020-01-30

## 2022-03-19 PROBLEM — I10 ESSENTIAL HYPERTENSION: Status: ACTIVE | Noted: 2017-03-09

## 2022-03-19 PROBLEM — R94.39 ABNORMAL STRESS ECHO: Status: ACTIVE | Noted: 2020-03-02

## 2022-03-19 PROBLEM — I25.83 CORONARY ARTERY DISEASE DUE TO LIPID RICH PLAQUE: Status: ACTIVE | Noted: 2018-05-08

## 2022-03-19 PROBLEM — I25.10 CORONARY ARTERY DISEASE DUE TO LIPID RICH PLAQUE: Status: ACTIVE | Noted: 2018-05-08

## 2022-03-19 PROBLEM — R06.09 DYSPNEA ON EXERTION: Status: ACTIVE | Noted: 2018-02-12

## 2022-03-19 PROBLEM — Z99.89 OSA ON CPAP: Status: ACTIVE | Noted: 2020-01-30

## 2022-03-20 PROBLEM — I42.1 HOCM (HYPERTROPHIC OBSTRUCTIVE CARDIOMYOPATHY) (HCC): Status: ACTIVE | Noted: 2017-04-03

## 2022-06-03 ENCOUNTER — OFFICE VISIT (OUTPATIENT)
Dept: CARDIOLOGY CLINIC | Age: 72
End: 2022-06-03
Payer: MEDICARE

## 2022-06-03 VITALS
WEIGHT: 190 LBS | HEART RATE: 58 BPM | SYSTOLIC BLOOD PRESSURE: 120 MMHG | DIASTOLIC BLOOD PRESSURE: 68 MMHG | HEIGHT: 72 IN | BODY MASS INDEX: 25.73 KG/M2

## 2022-06-03 DIAGNOSIS — I42.1 HOCM (HYPERTROPHIC OBSTRUCTIVE CARDIOMYOPATHY) (HCC): ICD-10-CM

## 2022-06-03 DIAGNOSIS — I35.1 NONRHEUMATIC AORTIC VALVE INSUFFICIENCY: ICD-10-CM

## 2022-06-03 DIAGNOSIS — I10 ESSENTIAL HYPERTENSION: Primary | ICD-10-CM

## 2022-06-03 PROCEDURE — 1036F TOBACCO NON-USER: CPT | Performed by: INTERNAL MEDICINE

## 2022-06-03 PROCEDURE — 1123F ACP DISCUSS/DSCN MKR DOCD: CPT | Performed by: INTERNAL MEDICINE

## 2022-06-03 PROCEDURE — 93000 ELECTROCARDIOGRAM COMPLETE: CPT | Performed by: INTERNAL MEDICINE

## 2022-06-03 PROCEDURE — 99214 OFFICE O/P EST MOD 30 MIN: CPT | Performed by: INTERNAL MEDICINE

## 2022-06-03 PROCEDURE — G8417 CALC BMI ABV UP PARAM F/U: HCPCS | Performed by: INTERNAL MEDICINE

## 2022-06-03 PROCEDURE — G8427 DOCREV CUR MEDS BY ELIG CLIN: HCPCS | Performed by: INTERNAL MEDICINE

## 2022-06-03 PROCEDURE — 3017F COLORECTAL CA SCREEN DOC REV: CPT | Performed by: INTERNAL MEDICINE

## 2022-06-03 ASSESSMENT — ENCOUNTER SYMPTOMS
ORTHOPNEA: 0
HEMATEMESIS: 0
SHORTNESS OF BREATH: 0
WHEEZING: 0
HEMATOCHEZIA: 0
HOARSE VOICE: 0
SPUTUM PRODUCTION: 0
ABDOMINAL PAIN: 0
BOWEL INCONTINENCE: 0
DIARRHEA: 0
BLURRED VISION: 0
COLOR CHANGE: 0

## 2022-06-03 NOTE — PROGRESS NOTES
[facility] CARDIOLOGY  [address], [address]  PHONE: [phone]        22        NAME:  Robyn Vail : 1950  MRN: [id number]       SUBJECTIVE:   Robyn Vail is a 81 y.o. male seen for a follow up visit regarding the following: HOCM. He has a hx of HOCM with alcohol septal ablation at [place] [address] [place]. He returns for annual follow up. Troy Piedra reports doing ok. Chief Complaint   Patient presents with    Hypertension     yearly follow up    Cardiomyopathy       HPI:    Hypertension  This is a chronic problem. The problem is unchanged. The problem is controlled. Associated symptoms include chest pain (Single episode of unspecified chest and left arm pain several months ago without recurrence.) and headaches. Pertinent negatives include no anxiety, blurred vision, malaise/fatigue, neck pain, orthopnea, palpitations, peripheral edema, PND, shortness of breath or sweats. Past Medical History, Past Surgical History, Family history, Social History, and Medications were all reviewed with the patient today and updated as necessary.          Current Outpatient Medications:     Multiple Vitamin (MULTIVITAMIN ADULT PO), Take by mouth daily, Disp: , Rfl:     aspirin 81 MG EC tablet, Take 81 mg by mouth daily, Disp: , Rfl:     ibuprofen (ADVIL;MOTRIN) 600 MG tablet, Take 600 mg by mouth every 6 hours as needed, Disp: , Rfl:     metoprolol succinate (TOPROL XL) 50 MG extended release tablet, Take 50 mg by mouth daily, Disp: , Rfl:     simvastatin (ZOCOR) 20 MG tablet, Take 20 mg by mouth , Disp: , Rfl:     Testosterone (ANDROGEL) 20.25 MG/ACT (1.62%) GEL gel, APPLY 2 DEPRESSIONS TO UPPER ARM IN THE MORNING BEFORE 9 AM, Disp: , Rfl:   No Known Allergies  Past Medical History:   Diagnosis Date    Abdominal pain, right upper quadrant     Acute bronchitis     Acute sinusitis, unspecified     Aortic regurgitation 2016    Moderate AI,LV EF=70.5%,normal LV size,moderate LVH,and mild diastolic dysfx. mild MR    Aortic valve disorders     Calculus of kidney     Calculus of kidney     Cardiac murmur     Chest pain, unspecified     Coronary artery disease due to lipid rich plaque 5/8/2018    Cysts of eyelids     Disturbance of skin sensation     Dyspnea     Encounter for long-term (current) use of other medications     Essential hypertension 3/9/2017    Fatigue     GERD (gastroesophageal reflux disease)     Glaucoma     Headache(784.0)     [facility]M [facility] cardiomyopathy[facility] [address]     Ill-defined condition     hereditary neuropathy    Impotence of organic origin     Insomnia, unspecified     MVP (mitral valve prolapse)     asymptomatic- dx 5-6 years ago    Nonrheumatic aortic valve insufficiency 10/5/2016    Nonspecific abnormal finding in stool contents     Other and unspecified hyperlipidemia     Other testicular hypofunction     Pain in joint, lower leg     Palpitations 4/18/2016    Personal history of kidney stones     Plantar fascial fibromatosis     PVC (premature ventricular contraction)     Restless legs syndrome (RLS)     Sleep apnea     cpap    SOB (shortness of breath)     Tension headache     Unspecified disorder of prostate      Past Surgical History:   Procedure Laterality Date    CARDIAC CATHETERIZATION Left 04/24/2018    LV EF=70%. LM:30% distal stenosis. LAD:10% luminal irregularities. RI:patent. LCX:20% mid & distal stenosis. RCA:50% mid stenosis with IFR:0.93.    CARDIAC CATHETERIZATION  03/16/2020    LV EF:NL. LM:20-30% distal stenosis. LAD:20% ostial& mid stenosis. Dx1:20-30% proximal stenosis. LCX:20% proximal & mid stenosis. RCA:20% proximal,40-50% mid stenosi. IFR=0.95 of RCA.     HERNIA REPAIR      right inguinal hernia (Allscript says Bilateral)    LITHOTRIPSY      ID CARDIAC SURG PROCEDURE UNLIST      ETOH Ablation    TESTICLE REMOVAL Left      Family History   Problem Relation Age of Onset    Cancer Brother         renal cell ca    Glaucoma Other     Breast Cancer Maternal Grandmother     Hypertension Mother     Breast Cancer Mother     Hypertension Father     Hypertension Other       Social History     Tobacco Use    Smoking status: Never Smoker    Smokeless tobacco: Never Used   Substance Use Topics    Alcohol use: No       ROS:    Review of Systems   Constitutional: Negative for chills, decreased appetite, diaphoresis, fever and malaise/fatigue. HENT: Negative for congestion, hearing loss, hoarse voice and nosebleeds. Eyes: Negative for blurred vision. Cardiovascular: Positive for chest pain (Single episode of unspecified chest and left arm pain several months ago without recurrence. ). Negative for claudication, cyanosis, dyspnea on exertion, irregular heartbeat, leg swelling, near-syncope, orthopnea, palpitations, paroxysmal nocturnal dyspnea and syncope. Respiratory: Negative for shortness of breath, sputum production and wheezing. Endocrine: Negative for polydipsia, polyphagia and polyuria. Skin: Negative for color change. Musculoskeletal: Negative for neck pain. Gastrointestinal: Negative for abdominal pain, bowel incontinence, diarrhea, hematemesis and hematochezia. Genitourinary: Negative for dysuria, frequency and hematuria. Neurological: Positive for headaches. Negative for focal weakness, light-headedness, loss of balance, numbness, sensory change and weakness. Psychiatric/Behavioral: Negative for altered mental status and memory loss. PHYSICAL EXAM:   /68   Pulse 58   Ht 6' (1.829 m)   Wt 190 lb (86.2 kg)   BMI 25.77 kg/m²      Physical Exam  Constitutional:       Appearance: Normal appearance. HENT:      Head: Normocephalic and atraumatic. Nose: Nose normal.   Eyes:      Extraocular Movements: Extraocular movements intact. Pupils: Pupils are equal, round, and reactive to light.    Neck:      Vascular: No carotid bruit. Cardiovascular:      Rate and Rhythm: Regular rhythm. Pulses: Normal pulses. Heart sounds: Murmur (2/6 SHARI) heard. Pulmonary:      Effort: Pulmonary effort is normal.      Breath sounds: Normal breath sounds. Abdominal:      General: Abdomen is flat. Bowel sounds are normal.      Palpations: Abdomen is soft. Musculoskeletal:         General: Normal range of motion. Cervical back: Normal range of motion and neck supple. Skin:     General: Skin is warm and dry. Neurological:      General: No focal deficit present. Mental Status: He is alert and oriented to person, place, and time. Psychiatric:         Mood and Affect: Mood normal.         Medical problems and test results were reviewed with the patient today. No results found for this or any previous visit (from the past 672 hour(s)). Lab Results   Component Value Date    CHOL 175 11/18/2021    HDL 43 11/18/2021    VLDL 32 11/18/2021     Results for orders placed or performed in visit on 06/03/22   EKG 12 lead    Impression    Sinus  Bradycardia   -Right bundle branch block. ABNORMAL        ASSESSMENT and PLAN    Nish Pavon was seen today for hypertension and cardiomyopathy. Diagnoses and all orders for this visit:    Essential hypertension:Stable. Continue Toprol 50 mg daily.  -     EKG 12 lead    HOCM (hypertrophic obstructive cardiomyopathy) ([address]): Follow up annual appointment at [address] in 7-2022. Nonrheumatic aortic valve insufficiency:Aymptomatic. Anticipate annual echo at [address] in 7-2022. Disposition:    Return in about 6 months (around 12/3/2022).                 Crow Crump MD  6/3/2022  3:33 PM

## 2022-08-17 ENCOUNTER — TELEPHONE (OUTPATIENT)
Dept: SLEEP MEDICINE | Age: 72
End: 2022-08-17

## 2022-08-17 NOTE — TELEPHONE ENCOUNTER
Patient says that he is wanting to try and see if he can qualify for the [facility] . He was told that he would have to have a new sleep study . He would like for someone in the sleep area to give him a call .

## 2022-08-23 RX ORDER — METOPROLOL SUCCINATE 50 MG/1
50 TABLET, EXTENDED RELEASE ORAL DAILY
Qty: 90 TABLET | Refills: 3 | Status: SHIPPED | OUTPATIENT
Start: 2022-08-23

## 2022-08-23 NOTE — TELEPHONE ENCOUNTER
Pt needs refill on Metoprolol Succinate 50mg sent to [facility] in [facility]. Please call pt with any questions. Thank you.

## 2022-08-23 NOTE — TELEPHONE ENCOUNTER
Per last office note, pt is currently taking Metoprolol 50 mg daily. Routing to Dr. Bethanne Cabot to sign.

## 2022-09-19 NOTE — PROGRESS NOTES
[address] Isis Mccarthy. [address] [facility], [address] [place] [address]  [phone]    Patient Name:  Ed Marinelli. YOB: 1950      Office Visit 9/22/2022    CHIEF COMPLAINT:    Chief Complaint   Patient presents with    CPAP/BiPAP    Sleep Apnea         HISTORY OF PRESENT ILLNESS:      The patient presents today in follow-up of obstructive sleep apnea. He has a history of severe obstructive sleep apnea with an AHI of 31.6 and desaturations as low as 87% back in 2016. The patient has been treated with a Respironics APAP device that had been recalled. At his last visit, an order was placed to change him to a new ResMed machine with a new mask but apparently he did not qualify for a new machine on the basis of the recall. He has since tried going back on his current fullface mask on his current APAP which is set at 8-15 cmH2O and was intolerant. His main issue is significant irritation of the bridge of his nose from his mask. I showed him the ResMed F 30 I mask which sits under the nose and covers the mouth and would not promote any irritation of the nose whatsoever. An external filter is available to be added to his current machine which would eliminate the issue with the internal filter breaking down and causing cancer. We also discussed the possibility of getting an Inspire device. I indicated to him that there would appear to be no definite contraindication to the device but he would have to qualify anatomically. Laryngoscopy would be necessary with his ENT who actually performs the insertion of the Diana device. It should be noted that the patient does have a history of a hypertrophic cardiomyopathy and required an alcohol ablation many years ago at [address]. He also has other valve issues and continues to be followed by cardiology here and in [place].     After our discussion, he would like to try the external filter on his machine and change to the F 30 I mask and see if this works. If so he could avoid surgery. Given his cardiac history, there could be issues with the presence of the Inspire device should a pacemaker or defibrillator being needed. The patient's [place] score today off of therapy is 10/24 consistent with borderline hypersomnia. As noted on his download he is totally abandoned use of the device. He was seen in [place] recently and everything was going well from his cardiac standpoint. They were going to permit him to get echocardiograms in [place] in the future. Sleep Medicine 9/22/2022 12/21/2021   Sitting and reading 2 2   Watching TV 2 2   Sitting, inactive in a public place (e.g. a theatre or a meeting) 1 0   As a passenger in a car for an hour without a break 0 3   Lying down to rest in the afternoon when circumstances permit 2 3   Sitting and talking to someone 1 0   Sitting quietly after a lunch without alcohol 2 2   In a car, while stopped for a few minutes in traffic 0 0   [place] Sleepiness Score 10 12               Ref Range & Units 9/20/22 0808   TSH 0.45 - 5.33 uIU/mL 3.53    Resulting Agency  [facility]       Past Medical History:   Diagnosis Date    Abdominal pain, right upper quadrant     Acute bronchitis     Acute sinusitis, unspecified     Aortic regurgitation 09/29/2016    Moderate AI,LV EF=70.5%,normal LV size,moderate LVH,and mild diastolic dysfx. mild MR    Aortic valve disorders     Calculus of kidney     Calculus of kidney     Cardiac murmur     Chest pain, unspecified     Coronary artery disease due to lipid rich plaque 5/8/2018    Cysts of eyelids     Disturbance of skin sensation     Dyspnea     Encounter for long-term (current) use of other medications     Essential hypertension 3/9/2017    Fatigue     GERD (gastroesophageal reflux disease)     Glaucoma     Headache(784.0)     HOCM (hypertrophic obstructive cardiomyopathy) (HCC)     Ill-defined condition     hereditary neuropathy Impotence of organic origin     Insomnia, unspecified     MVP (mitral valve prolapse)     asymptomatic- dx 5-6 years ago    Nonrheumatic aortic valve insufficiency 10/5/2016    Nonspecific abnormal finding in stool contents     Other and unspecified hyperlipidemia     Other testicular hypofunction     Pain in joint, lower leg     Palpitations 4/18/2016    Personal history of kidney stones     Plantar fascial fibromatosis     PVC (premature ventricular contraction)     Restless legs syndrome (RLS)     Sleep apnea     cpap    SOB (shortness of breath)     Tension headache     Unspecified disorder of prostate          [unfilled]      Past Surgical History:   Procedure Laterality Date    CARDIAC CATHETERIZATION Left 04/24/2018    LV EF=70%. LM:30% distal stenosis. LAD:10% luminal irregularities. RI:patent. LCX:20% mid & distal stenosis. RCA:50% mid stenosis with IFR:0.93. CARDIAC CATHETERIZATION  03/16/2020    LV EF:NL. LM:20-30% distal stenosis. LAD:20% ostial& mid stenosis. Dx1:20-30% proximal stenosis. LCX:20% proximal & mid stenosis. RCA:20% proximal,40-50% mid stenosi. IFR=0.95 of RCA.     HERNIA REPAIR      right inguinal hernia (Allscript says Bilateral)    LITHOTRIPSY      IL CARDIAC SURG PROCEDURE UNLIST      ETOH Ablation    TESTICLE REMOVAL Left        [unfilled]        Social History     Socioeconomic History    Marital status:      Spouse name: Not on file    Number of children: Not on file    Years of education: Not on file    Highest education level: Not on file   Occupational History    Not on file   Tobacco Use    Smoking status: Never    Smokeless tobacco: Never   Substance and Sexual Activity    Alcohol use: No    Drug use: Yes     Types: Prescription    Sexual activity: Not on file   Other Topics Concern    Not on file   Social History Narrative    Not on file     Social Determinants of Health     Financial Resource Strain: Not on file   Food Insecurity: Not on file   Transportation Needs: Not on file   Physical Activity: Not on file   Stress: Not on file   Social Connections: Not on file   Intimate Partner Violence: Not on file   Housing Stability: Not on file         Family History   Problem Relation Age of Onset    Cancer Brother         renal cell ca    Glaucoma Other     Breast Cancer Maternal Grandmother     Hypertension Mother     Breast Cancer Mother     Hypertension Father     Hypertension Other          No Known Allergies      Current Outpatient Medications   Medication Sig    metoprolol succinate (TOPROL XL) 50 MG extended release tablet Take 1 tablet by mouth daily    Multiple Vitamin (MULTIVITAMIN ADULT PO) Take by mouth daily    aspirin 81 MG EC tablet Take 81 mg by mouth daily    ibuprofen (ADVIL;MOTRIN) 600 MG tablet Take 600 mg by mouth every 6 hours as needed    simvastatin (ZOCOR) 20 MG tablet Take 20 mg by mouth     Testosterone (ANDROGEL) 20.25 MG/ACT (1.62%) GEL gel APPLY 2 DEPRESSIONS TO UPPER ARM IN THE MORNING BEFORE 9 AM     No current facility-administered medications for this visit. REVIEW OF SYSTEMS:   CONSTITUTIONAL:   There is no history of fever, chills, night sweats, weight loss, weight gain, persistent fatigue, or lethargy/hypersomnolence. CARDIAC:   No chest pain, pressure, discomfort, palpitations, orthopnea, murmurs, or edema. GI:   No dysphagia, heartburn reflux, nausea/vomiting, diarrhea, abdominal pain, or bleeding. NEURO:   There is no history of AMS, persistent headache, decreased level of consciousness, seizures, or motor or sensory deficits. PHYSICAL EXAM:    Vitals:    09/22/22 1049   BP: (!) 150/80   Pulse: 82   Resp: 18   Temp: 97.2 °F (36.2 °C)   SpO2: 96%        GENERAL APPEARANCE:   The patient is normal weight and in no respiratory distress. HEENT:   PERRL. Conjunctivae unremarkable. Nasal mucosa is without epistaxis, exudate, or polyps. Gums and dentition are unremarkable.   There is moderate oropharyngeal narrowing. NECK/LYMPHATIC:   Symmetrical with no elevation of jugular venous pulsation. Trachea midline. No thyroid enlargement. No cervical adenopathy. LUNGS:   Normal respiratory effort with symmetrical lung expansion. Breath sounds are clear bilaterally. [place] HEART:   There is a regular rate and rhythm. No murmur, rub, or gallop. There is no edema in the lower extremities. ABDOMEN:   Soft and non-tender. #That bowel sounds are normal.     NEURO:   The patient is alert and oriented to person, place, and time. Memory appears intact and mood is normal.  No gross sensorimotor deficits are present. ASSESSMENT:  (Medical Decision Making)       ICD-10-CM    1. DINESH (obstructive sleep apnea)  G47.33 The patient has been untreated for his sleep apnea because of issues with his Respironics machine. We can get him an external filter and he can try a new facemask to see if this controls his sleep apnea effectively. 2. Hypersomnia  G47.10 This is borderline at the present time. PLAN:    The patient is given information to be able to order the external filter for his Respironics machine. A new supply order is generated for the patient to get a ResMed F 30 I mask. The patient will keep his December appointment at which point we can see how he is doing with the new mask and whether he needs to be referred to ENT for an [facility] evaluation. Orders Placed This Encounter   Procedures    [facility] - [address] [place] [address] PULMONARY AND CRITICAL CARE  Phone: [address] [place] [phone]  Dept: [phone]      Patient Name: Katelyn Mercado.   : 1950  Gender: male  Address: [address] [place] [id number]  Patient phone: [phone] (home)       Primary Insurance: Payor: MEDICARE / Plan: MEDICARE PART A AND B / Product Type: *No Product type* /   Subscriber ID: [id number] - (Medicare)      AMB Supply Order  Order Details     DME Location: [facility]   Order Date: 9/22/2022   The primary encounter diagnosis was DINESH (obstructive sleep apnea). Diagnoses of Restless legs syndrome (RLS), Hypersomnia, and Insomnia, unspecified type were also pertinent to this visit. (  X   )Supplies Needed        Machine   (     ) CPAP Unit  (     ) Auto CPAP Unit  (     ) BiLevel Unit  (     ) Auto BiLevel Unit  (     ) ASV   (     ) Bilevel ST      Length of need: 12 months    Pressure: 8-15 cmH20  EPR: 2     Starting Ramp Pressure:  8 cm H20  Ramp Time: min N/A    Patient had a diagnostic Apnea Hypopnea Index (AHI) of : 31.6  *SUPPLIES* Replace all as needed, or per coverage guidelines     Masks Type:  ( x   ) -Full Face Mask (1 per 3 mon) <<< Please size and supply patient with a Resmed F30i mask >>>   (  x  ) -Full Mask (1 per month) Interface/Cushion      (  ) -Nasal Mask (1 per 3 mon)  (  ) - Nasal Mask (1 per month) Interface/Cushion  (     ) -Pillow (2 per mon)  (     ) -Robgehgas (1 per 6 mon)            Other Supplies:    (   X  )-Czmhjodn (1 per 6 mon)  (   X  )-Uavlcn Tubing (1 per 3 mon)  (   X  )- Disposable Filter (2 per mon)  (   x  )-[place] Humidifier (1 per year)     ( x    )-Kfjffvxuq (sometimes used with Full Face Mask) (1 per 6 mos)  (    )-Tubing-without heat (1 per 3 mos)  (     )-Non-Disposable Filter (1 per 6 mos)  (  x   )-Water Chamber (1 per 6 mos)  (     )-Humidifier non-heated (1 per 5 yrs)      Signed Date: 9/22/2022  Electronically Signed By: Jethro Gonzalez MD  Electronically Dated:  9/22/2022        No orders of the defined types were placed in this encounter.          Jethro Gonzalez MD  Electronically signed    Over 50% of today's office visit was spent in face to face time reviewing test results, prognosis, importance of compliance, education about disease process, benefits of medications, instructions for management of acute flare-ups, and follow up plans. Total face to face time spent with the patient and charting was 23 minutes. Dictated using voice recognition software.   Proof read but unrecognized errors may exist.

## 2022-09-22 ENCOUNTER — OFFICE VISIT (OUTPATIENT)
Dept: SLEEP MEDICINE | Age: 72
End: 2022-09-22
Payer: MEDICARE

## 2022-09-22 VITALS
HEIGHT: 72 IN | DIASTOLIC BLOOD PRESSURE: 80 MMHG | WEIGHT: 189.4 LBS | BODY MASS INDEX: 25.65 KG/M2 | HEART RATE: 82 BPM | SYSTOLIC BLOOD PRESSURE: 150 MMHG | TEMPERATURE: 97.2 F | OXYGEN SATURATION: 96 % | RESPIRATION RATE: 18 BRPM

## 2022-09-22 DIAGNOSIS — G47.33 OSA (OBSTRUCTIVE SLEEP APNEA): Primary | ICD-10-CM

## 2022-09-22 DIAGNOSIS — G47.10 HYPERSOMNIA: ICD-10-CM

## 2022-09-22 PROCEDURE — G8417 CALC BMI ABV UP PARAM F/U: HCPCS | Performed by: INTERNAL MEDICINE

## 2022-09-22 PROCEDURE — G8427 DOCREV CUR MEDS BY ELIG CLIN: HCPCS | Performed by: INTERNAL MEDICINE

## 2022-09-22 PROCEDURE — 1036F TOBACCO NON-USER: CPT | Performed by: INTERNAL MEDICINE

## 2022-09-22 PROCEDURE — 3017F COLORECTAL CA SCREEN DOC REV: CPT | Performed by: INTERNAL MEDICINE

## 2022-09-22 PROCEDURE — 1123F ACP DISCUSS/DSCN MKR DOCD: CPT | Performed by: INTERNAL MEDICINE

## 2022-09-22 PROCEDURE — 99214 OFFICE O/P EST MOD 30 MIN: CPT | Performed by: INTERNAL MEDICINE

## 2022-09-22 ASSESSMENT — SLEEP AND FATIGUE QUESTIONNAIRES
HOW LIKELY ARE YOU TO NOD OFF OR FALL ASLEEP WHILE SITTING AND TALKING TO SOMEONE: 1
HOW LIKELY ARE YOU TO NOD OFF OR FALL ASLEEP IN A CAR, WHILE STOPPED FOR A FEW MINUTES IN TRAFFIC: 0
HOW LIKELY ARE YOU TO NOD OFF OR FALL ASLEEP WHEN YOU ARE A PASSENGER IN A CAR FOR AN HOUR WITHOUT A BREAK: 0
HOW LIKELY ARE YOU TO NOD OFF OR FALL ASLEEP WHILE LYING DOWN TO REST IN THE AFTERNOON WHEN CIRCUMSTANCES PERMIT: 2
ESS TOTAL SCORE: 10
HOW LIKELY ARE YOU TO NOD OFF OR FALL ASLEEP WHILE WATCHING TV: 2
HOW LIKELY ARE YOU TO NOD OFF OR FALL ASLEEP WHILE SITTING QUIETLY AFTER LUNCH WITHOUT ALCOHOL: 2
HOW LIKELY ARE YOU TO NOD OFF OR FALL ASLEEP WHILE SITTING AND READING: 2
HOW LIKELY ARE YOU TO NOD OFF OR FALL ASLEEP WHILE SITTING INACTIVE IN A PUBLIC PLACE: 1

## 2022-10-14 ENCOUNTER — CLINICAL DOCUMENTATION (OUTPATIENT)
Dept: INTERNAL MEDICINE CLINIC | Facility: CLINIC | Age: 72
End: 2022-10-14

## 2022-11-22 ENCOUNTER — OFFICE VISIT (OUTPATIENT)
Dept: INTERNAL MEDICINE CLINIC | Facility: CLINIC | Age: 72
End: 2022-11-22
Payer: MEDICARE

## 2022-11-22 VITALS
WEIGHT: 196 LBS | SYSTOLIC BLOOD PRESSURE: 150 MMHG | TEMPERATURE: 97.2 F | HEART RATE: 60 BPM | DIASTOLIC BLOOD PRESSURE: 71 MMHG | BODY MASS INDEX: 26.55 KG/M2 | OXYGEN SATURATION: 95 % | HEIGHT: 72 IN | RESPIRATION RATE: 17 BRPM

## 2022-11-22 DIAGNOSIS — Z79.899 ENCOUNTER FOR LONG-TERM (CURRENT) USE OF MEDICATIONS: ICD-10-CM

## 2022-11-22 DIAGNOSIS — I25.10 ATHEROSCLEROSIS OF NATIVE CORONARY ARTERY OF NATIVE HEART WITHOUT ANGINA PECTORIS: ICD-10-CM

## 2022-11-22 DIAGNOSIS — I10 ESSENTIAL HYPERTENSION: Primary | ICD-10-CM

## 2022-11-22 DIAGNOSIS — E78.5 DYSLIPIDEMIA: ICD-10-CM

## 2022-11-22 DIAGNOSIS — G47.33 OBSTRUCTIVE SLEEP APNEA (ADULT) (PEDIATRIC): ICD-10-CM

## 2022-11-22 LAB
ALBUMIN SERPL-MCNC: 4.1 G/DL (ref 3.2–4.6)
ALBUMIN/GLOB SERPL: 1.4 {RATIO} (ref 0.4–1.6)
ALP SERPL-CCNC: 72 U/L (ref 50–136)
ALT SERPL-CCNC: 33 U/L (ref 12–65)
ANION GAP SERPL CALC-SCNC: 6 MMOL/L (ref 2–11)
AST SERPL-CCNC: 16 U/L (ref 15–37)
BASOPHILS # BLD: 0.1 K/UL (ref 0–0.2)
BASOPHILS NFR BLD: 1 % (ref 0–2)
BILIRUB SERPL-MCNC: 0.7 MG/DL (ref 0.2–1.1)
BUN SERPL-MCNC: 11 MG/DL (ref 8–23)
CALCIUM SERPL-MCNC: 10 MG/DL (ref 8.3–10.4)
CHLORIDE SERPL-SCNC: 106 MMOL/L (ref 101–110)
CHOLEST SERPL-MCNC: 200 MG/DL
CO2 SERPL-SCNC: 26 MMOL/L (ref 21–32)
CREAT SERPL-MCNC: 1.1 MG/DL (ref 0.8–1.5)
DIFFERENTIAL METHOD BLD: NORMAL
EOSINOPHIL # BLD: 0.2 K/UL (ref 0–0.8)
EOSINOPHIL NFR BLD: 3 % (ref 0.5–7.8)
ERYTHROCYTE [DISTWIDTH] IN BLOOD BY AUTOMATED COUNT: 13.6 % (ref 11.9–14.6)
GLOBULIN SER CALC-MCNC: 3 G/DL (ref 2.8–4.5)
GLUCOSE SERPL-MCNC: 98 MG/DL (ref 65–100)
HCT VFR BLD AUTO: 45.6 % (ref 41.1–50.3)
HDLC SERPL-MCNC: 44 MG/DL (ref 40–60)
HDLC SERPL: 4.5 {RATIO}
HGB BLD-MCNC: 15.3 G/DL (ref 13.6–17.2)
IMM GRANULOCYTES # BLD AUTO: 0 K/UL (ref 0–0.5)
IMM GRANULOCYTES NFR BLD AUTO: 0 % (ref 0–5)
LDLC SERPL CALC-MCNC: 109.6 MG/DL
LYMPHOCYTES # BLD: 1.6 K/UL (ref 0.5–4.6)
LYMPHOCYTES NFR BLD: 30 % (ref 13–44)
MCH RBC QN AUTO: 29.6 PG (ref 26.1–32.9)
MCHC RBC AUTO-ENTMCNC: 33.6 G/DL (ref 31.4–35)
MCV RBC AUTO: 88.2 FL (ref 82–102)
MONOCYTES # BLD: 0.4 K/UL (ref 0.1–1.3)
MONOCYTES NFR BLD: 8 % (ref 4–12)
NEUTS SEG # BLD: 2.9 K/UL (ref 1.7–8.2)
NEUTS SEG NFR BLD: 58 % (ref 43–78)
NRBC # BLD: 0 K/UL (ref 0–0.2)
PLATELET # BLD AUTO: 179 K/UL (ref 150–450)
PMV BLD AUTO: 11.8 FL (ref 9.4–12.3)
POTASSIUM SERPL-SCNC: 4.4 MMOL/L (ref 3.5–5.1)
PROT SERPL-MCNC: 7.1 G/DL (ref 6.3–8.2)
RBC # BLD AUTO: 5.17 M/UL (ref 4.23–5.6)
SODIUM SERPL-SCNC: 138 MMOL/L (ref 133–143)
TRIGL SERPL-MCNC: 232 MG/DL (ref 35–150)
VLDLC SERPL CALC-MCNC: 46.4 MG/DL (ref 6–23)
WBC # BLD AUTO: 5.1 K/UL (ref 4.3–11.1)

## 2022-11-22 PROCEDURE — 1123F ACP DISCUSS/DSCN MKR DOCD: CPT | Performed by: INTERNAL MEDICINE

## 2022-11-22 PROCEDURE — 3078F DIAST BP <80 MM HG: CPT | Performed by: INTERNAL MEDICINE

## 2022-11-22 PROCEDURE — G8417 CALC BMI ABV UP PARAM F/U: HCPCS | Performed by: INTERNAL MEDICINE

## 2022-11-22 PROCEDURE — G8484 FLU IMMUNIZE NO ADMIN: HCPCS | Performed by: INTERNAL MEDICINE

## 2022-11-22 PROCEDURE — 1036F TOBACCO NON-USER: CPT | Performed by: INTERNAL MEDICINE

## 2022-11-22 PROCEDURE — 3074F SYST BP LT 130 MM HG: CPT | Performed by: INTERNAL MEDICINE

## 2022-11-22 PROCEDURE — G8427 DOCREV CUR MEDS BY ELIG CLIN: HCPCS | Performed by: INTERNAL MEDICINE

## 2022-11-22 PROCEDURE — 99213 OFFICE O/P EST LOW 20 MIN: CPT | Performed by: INTERNAL MEDICINE

## 2022-11-22 PROCEDURE — 3017F COLORECTAL CA SCREEN DOC REV: CPT | Performed by: INTERNAL MEDICINE

## 2022-11-22 SDOH — ECONOMIC STABILITY: FOOD INSECURITY: WITHIN THE PAST 12 MONTHS, THE FOOD YOU BOUGHT JUST DIDN'T LAST AND YOU DIDN'T HAVE MONEY TO GET MORE.: NEVER TRUE

## 2022-11-22 SDOH — ECONOMIC STABILITY: FOOD INSECURITY: WITHIN THE PAST 12 MONTHS, YOU WORRIED THAT YOUR FOOD WOULD RUN OUT BEFORE YOU GOT MONEY TO BUY MORE.: NEVER TRUE

## 2022-11-22 ASSESSMENT — PATIENT HEALTH QUESTIONNAIRE - PHQ9
1. LITTLE INTEREST OR PLEASURE IN DOING THINGS: 0
SUM OF ALL RESPONSES TO PHQ QUESTIONS 1-9: 0
SUM OF ALL RESPONSES TO PHQ9 QUESTIONS 1 & 2: 0
SUM OF ALL RESPONSES TO PHQ QUESTIONS 1-9: 0
2. FEELING DOWN, DEPRESSED OR HOPELESS: 0

## 2022-11-22 ASSESSMENT — SOCIAL DETERMINANTS OF HEALTH (SDOH): HOW HARD IS IT FOR YOU TO PAY FOR THE VERY BASICS LIKE FOOD, HOUSING, MEDICAL CARE, AND HEATING?: NOT HARD AT ALL

## 2022-11-22 NOTE — PROGRESS NOTES
11/22/2022   Location:[facility] [address] [facility]  [place]  Patient #:  [id number]  YOB: 1950        History of Present Illness     Chief Complaint   Patient presents with    Hypertension    Cholesterol Problem    3 Month Follow-Up     No new complaints. No refills needed. Mr. Ketty Lauren is a 81 y.o. male  who presents for Follow up on chronic medical issues. There is compliance and tolerance with medications. Chronic hypertension for years. Treated with beta-blocker. Compliance with low salt diet is good. Compliance with recommended regular exercise is good. Denies any  problems with current meds and reports compliance. Denies any issues with chest pain or SOB. No vision changes or problem head aches. He has been evaluated at [address] for HOCM and treated with alcohol septal ablation procedure 6/2018. He had follow up echo 7/2022. Denies SOB. Has chronic hyperlipidemia for years treated with statin. Also recommended high fiber, low fat diet and regular exercise. Tolerating medications. Reports compliance with meds and life style recommendations. Sees ortho for knee pain. Seeing sleep medicine for DINESH on CPAP. Seeing endocrinologist for gynecomastia. He is on testosterone. Complains of fatigue after 18 holes of golf. Home BP readings 10-10-22 110/69  Yearly [id number] from [facility] on his phone.     Last Labs  CBC:   Lab Results   Component Value Date/Time    WBC 3.6 11/13/2020 10:37 AM    RBC 4.75 11/13/2020 10:37 AM    HGB 14.2 11/13/2020 10:37 AM    HCT 41.4 11/13/2020 10:37 AM    MCV 87 11/13/2020 10:37 AM    MCH 29.9 11/13/2020 10:37 AM    MCHC 34.3 11/13/2020 10:37 AM    RDW 14.5 11/13/2020 10:37 AM     11/13/2020 10:37 AM    MPV 12.4 03/06/2020 09:40 AM     CMP:    Lab Results   Component Value Date/Time     11/18/2021 09:21 AM    K 4.4 11/18/2021 09:21 AM     11/18/2021 09:21 AM    CO2 23 11/18/2021 09:21 AM    BUN 16 11/18/2021 09:21 AM    CREATININE 1.03 11/18/2021 09:21 AM    GFRAA 85 11/18/2021 09:21 AM    AGRATIO 1.6 11/13/2020 10:37 AM    GLUCOSE 95 11/18/2021 09:21 AM    PROT 7.0 11/13/2020 10:37 AM    LABALBU 4.3 11/13/2020 10:37 AM    CALCIUM 9.3 11/18/2021 09:21 AM    BILITOT 0.9 11/13/2020 10:37 AM    ALKPHOS 56 11/13/2020 10:37 AM    AST 28 11/13/2020 10:37 AM    ALT 29 11/13/2020 10:37 AM     HgBA1c:  No results found for: LABA1C  Microalbumen/Creatinine ratio:  No components found for: RUCREAT  FLP:    Lab Results   Component Value Date/Time    TRIG 186 11/18/2021 09:21 AM    HDL 43 11/18/2021 09:21 AM    LDLCALC 100 11/18/2021 09:21 AM    LABVLDL 51 10/21/2019 11:00 AM     TSH:    Lab Results   Component Value Date/Time    TSH 1.520 11/13/2020 10:37 AM       No Known Allergies  Past Medical History:   Diagnosis Date    Abdominal pain, right upper quadrant     Acute bronchitis     Acute sinusitis, unspecified     Aortic regurgitation 09/29/2016    Moderate AI,LV EF=70.5%,normal LV size,moderate LVH,and mild diastolic dysfx. mild MR    Aortic valve disorders     Calculus of kidney     Calculus of kidney     Cardiac murmur     Chest pain, unspecified     Coronary artery disease due to lipid rich plaque 5/8/2018    Cysts of eyelids     Disturbance of skin sensation     Dyspnea     Encounter for long-term (current) use of other medications     Essential hypertension 3/9/2017    Fatigue     GERD (gastroesophageal reflux disease)     Glaucoma     Headache(784.0)     HOCM (hypertrophic obstructive cardiomyopathy) (HCC)     Ill-defined condition     hereditary neuropathy    Impotence of organic origin     Insomnia, unspecified     MVP (mitral valve prolapse)     asymptomatic- dx 5-6 years ago    Nonrheumatic aortic valve insufficiency 10/5/2016    Nonspecific abnormal finding in stool contents     Other and unspecified hyperlipidemia     Other testicular hypofunction     Pain in joint, lower leg     Palpitations 4/18/2016 Personal history of kidney stones     Plantar fascial fibromatosis     PVC (premature ventricular contraction)     Restless legs syndrome (RLS)     Sleep apnea     cpap    SOB (shortness of breath)     Tension headache     Unspecified disorder of prostate      Social History     Socioeconomic History    Marital status:      Spouse name: None    Number of children: None    Years of education: None    Highest education level: None   Tobacco Use    Smoking status: Never    Smokeless tobacco: Never   Substance and Sexual Activity    Alcohol use: No    Drug use: Yes     Types: Prescription     Social Determinants of Health     Financial Resource Strain: Low Risk     Difficulty of Paying Living Expenses: Not hard at all   Food Insecurity: No Food Insecurity    Worried About Running Out of Food in the Last Year: Never true    Ran Out of Food in the Last Year: Never true     Past Surgical History:   Procedure Laterality Date    CARDIAC CATHETERIZATION Left 04/24/2018    LV EF=70%. LM:30% distal stenosis. LAD:10% luminal irregularities. RI:patent. LCX:20% mid & distal stenosis. RCA:50% mid stenosis with IFR:0.93. CARDIAC CATHETERIZATION  03/16/2020    LV EF:NL. LM:20-30% distal stenosis. LAD:20% ostial& mid stenosis. Dx1:20-30% proximal stenosis. LCX:20% proximal & mid stenosis. RCA:20% proximal,40-50% mid stenosi. IFR=0.95 of RCA.     HERNIA REPAIR      right inguinal hernia (Allscript says Bilateral)    LITHOTRIPSY      ID CARDIAC SURG PROCEDURE UNLIST      ETOH Ablation    TESTICLE REMOVAL Left      Family History   Problem Relation Age of Onset    Cancer Brother         renal cell ca    Glaucoma Other     Breast Cancer Maternal Grandmother     Hypertension Mother     Breast Cancer Mother     Hypertension Father     Hypertension Other      Current Outpatient Medications   Medication Sig Dispense Refill    metoprolol succinate (TOPROL XL) 50 MG extended release tablet Take 1 tablet by mouth daily 90 tablet 3    Multiple Vitamin (MULTIVITAMIN ADULT PO) Take by mouth daily      aspirin 81 MG EC tablet Take 81 mg by mouth daily      ibuprofen (ADVIL;MOTRIN) 600 MG tablet Take 600 mg by mouth every 6 hours as needed      simvastatin (ZOCOR) 20 MG tablet Take 20 mg by mouth       Testosterone (ANDROGEL) 20.25 MG/ACT (1.62%) GEL gel APPLY 2 DEPRESSIONS TO UPPER ARM IN THE MORNING BEFORE 9 AM       No current facility-administered medications for this visit. Health Maintenance   Topic Date Due    DTaP/Tdap/Td vaccine (1 - Tdap) 10/23/2015    COVID-19 Vaccine (5 - Booster for Moderna series) 12/28/2021    Depression Screen  11/16/2022    Annual Wellness Visit (AWV)  11/17/2022    Lipids  11/18/2022    Colorectal Cancer Screen  08/14/2023    Flu vaccine  Completed    Shingles vaccine  Completed    Pneumococcal 65+ years Vaccine  Completed    Hepatitis C screen  Completed    Hepatitis A vaccine  Aged Out    Hib vaccine  Aged Out    Meningococcal (ACWY) vaccine  Aged Out             Review of Systems  Review of Systems   Constitutional:  Negative for fever. Eyes:  Negative for visual disturbance. Respiratory:  Negative for cough and shortness of breath. Gastrointestinal:  Negative for abdominal pain. Genitourinary:  Negative for difficulty urinating. Musculoskeletal:  Positive for arthralgias. BP (!) 150/71 (Site: Left Upper Arm, Position: Sitting, Cuff Size: Large Adult)   Pulse 60   Temp 97.2 °F (36.2 °C) (Skin)   Resp 17   Ht 6' (1.829 m)   Wt 196 lb (88.9 kg)   SpO2 95%   BMI 26.58 kg/m²     Wt Readings from Last 3 Encounters:   11/22/22 196 lb (88.9 kg)   09/22/22 189 lb 6.4 oz (85.9 kg)   06/03/22 190 lb (86.2 kg)       Physical Exam    Physical Exam  Vitals and nursing note reviewed. Constitutional:       Appearance: Normal appearance. He is obese. HENT:      Head: Normocephalic and atraumatic.       Nose: Nose normal.      Mouth/Throat:      Mouth: Mucous membranes are moist.   Eyes:      Extraocular Movements: Extraocular movements intact. Conjunctiva/sclera: Conjunctivae normal.      Pupils: Pupils are equal, round, and reactive to light. Neck:      Vascular: No carotid bruit. Cardiovascular:      Rate and Rhythm: Normal rate and regular rhythm. Pulmonary:      Effort: Pulmonary effort is normal.      Breath sounds: Normal breath sounds. Abdominal:      General: Bowel sounds are normal.      Palpations: Abdomen is soft. Tenderness: There is no abdominal tenderness. Lymphadenopathy:      Cervical: No cervical adenopathy. Skin:     General: Skin is warm and dry. Neurological:      General: No focal deficit present. Mental Status: He is alert. Psychiatric:         Mood and Affect: Mood normal.           Assessment & Plan    Encounter Diagnoses   Name Primary? Essential hypertension Yes    Dyslipidemia     Obstructive sleep apnea (adult) (pediatric)     Atherosclerosis of native coronary artery of native heart without angina pectoris     Encounter for long-term (current) use of medications        No orders of the defined types were placed in this encounter. Orders Placed This Encounter   Procedures    Lipid Panel     Standing Status:   Future     Number of Occurrences:   1     Standing Expiration Date:   11/22/2023    CBC with Auto Differential     Standing Status:   Future     Number of Occurrences:   1     Standing Expiration Date:   11/22/2023    Comprehensive Metabolic Panel     Standing Status:   Future     Number of Occurrences:   1     Standing Expiration Date:   11/22/2023     The patient is asked to make an attempt to improve diet and exercise patterns to aid in medical management of this problem. Discussed the importance of regular exercise and a healthy diet. He has upcoming appt with cardiologist to have BP rechecked. Reviewed recommended vaccines and screenings      No follow-ups on file.         Ke Farris MD

## 2022-11-23 ENCOUNTER — TELEPHONE (OUTPATIENT)
Dept: INTERNAL MEDICINE CLINIC | Facility: CLINIC | Age: 72
End: 2022-11-23

## 2022-11-23 DIAGNOSIS — E78.5 DYSLIPIDEMIA: Primary | ICD-10-CM

## 2022-11-23 NOTE — TELEPHONE ENCOUNTER
----- Message from Jovani Lira MD sent at 11/23/2022  1:04 PM EST -----  Please call and notify patient:   Recent labs were reviewed. Glucose/Blood sugar was normal.  Kidney function, liver function and blood counts were normal.  Cholesterol is a higher. Consider increasing Zocor 40mg and repeat lipids in 8 weeks with ALT.    Jovani Lira MD

## 2022-11-28 RX ORDER — SIMVASTATIN 40 MG
40 TABLET ORAL NIGHTLY
Qty: 90 TABLET | Refills: 3 | Status: SHIPPED | OUTPATIENT
Start: 2022-11-28

## 2022-12-03 ASSESSMENT — ENCOUNTER SYMPTOMS
SHORTNESS OF BREATH: 0
COUGH: 0
ABDOMINAL PAIN: 0

## 2022-12-23 NOTE — PROGRESS NOTES
[address] [place] [address] Court, [address] [facility]  [phone]    Patient Name:  Johnnie Hodge. YOB: 1950      Office Visit 12/29/2022    CHIEF COMPLAINT:    Chief Complaint   Patient presents with    Follow-up    Sleep Apnea           HISTORY OF PRESENT ILLNESS:         This patient was seen today for follow-up of sleep apnea and restless leg syndrome. He was diagnosed with a sleep disordered breathing in 2016 and his AHI was 31.6/hour  and the lowest oxygen saturation was 87%. Subsequently he was started on auto CPAP at 8-15 cm with heated humidity using comfort gel mask. He had a replacement machine since his previous machine was in the recall list.  He is using a new mask which is a full facemask and he is feeling much better with that. He is using and benefiting from his CPAP machine on a daily basis and his average daily use is 5 hours and 36 minutes. His AHI is 8.1/hour and his average pressure requirement is 10.5 cm. Of note that his restless leg  was treated previously with Lyrica but currently is off that. His last ferritin level last year was 46. He has some question regarding inspire device which was discussed with him previously but he want to hold on that since he is tolerating CPAP better with the newer mask. We will continue his setting at the same level of 8-15 cm and arrange follow-up in a few months to make sure he is tolerating that quite well. Hopefully his compliance will continue to improve. The [place] score today continue to be elevated at 14/24. I suspect this is related to suboptimal compliance with the CPAP. Past Medical History:   Diagnosis Date    Aortic regurgitation 09/29/2016    Moderate AI,LV EF=70.5%,normal LV size,moderate LVH,and mild diastolic dysfx. mild MR    Aortic valve disorders     Calculus of kidney     Cardiac murmur     Coronary artery disease due to lipid rich plaque 05/08/2018    Cysts of eyelids Disturbance of skin sensation     Encounter for long-term (current) use of other medications     Essential hypertension 03/09/2017    Fatigue     GERD (gastroesophageal reflux disease)     Glaucoma     Headache(784.0)     HOCM (hypertrophic obstructive cardiomyopathy) ([facility])     Ill-defined condition     hereditary neuropathy    Impotence of organic origin     Insomnia, unspecified     MVP (mitral valve prolapse)     asymptomatic- dx 5-6 years ago    Nonrheumatic aortic valve insufficiency 10/05/2016    Nonspecific abnormal finding in stool contents     Other and unspecified hyperlipidemia     Other testicular hypofunction     Palpitations 04/18/2016    Personal history of kidney stones     Plantar fascial fibromatosis     PVC (premature ventricular contraction)     Restless legs syndrome (RLS)     Sleep apnea     cpap    SOB (shortness of breath)     Tension headache     Unspecified disorder of prostate          Patient Active Problem List   Diagnosis    Dyslipidemia    Nonspecific abnormal finding in stool contents    Aortic valve disorder    Nonrheumatic aortic valve insufficiency    Chest pain    Cysts of eyelids    Essential hypertension    Calculus of kidney    Insomnia, unspecified    Restless legs syndrome (RLS)    Personal history of kidney stones    Palpitations    Low testosterone    Encounter for long-term (current) use of medications    Abnormal stress echo    Dyspnea on exertion    Coronary artery disease due to lipid rich plaque    DINESH (obstructive sleep apnea)    HOCM (hypertrophic obstructive cardiomyopathy) ([facility] [address]    Hypersomnia          Past Surgical History:   Procedure Laterality Date    CARDIAC CATHETERIZATION Left 04/24/2018    LV EF=70%. LM:30% distal stenosis. LAD:10% luminal irregularities. RI:patent. LCX:20% mid & distal stenosis. RCA:50% mid stenosis with IFR:0.93. CARDIAC CATHETERIZATION  03/16/2020    LV EF:NL. LM:20-30% distal stenosis. LAD:20% ostial& mid stenosis. Dx1:20-30% proximal stenosis. LCX:20% proximal & mid stenosis. RCA:20% proximal,40-50% mid stenosi. IFR=0.95 of RCA.     HERNIA REPAIR      right inguinal hernia (Allscript says Bilateral)    LITHOTRIPSY      [place] CARDIAC SURG PROCEDURE UNLIST      ETOH Ablation    TESTICLE REMOVAL Left        [unfilled]        Social History     Socioeconomic History    Marital status:      Spouse name: Not on file    Number of children: Not on file    Years of education: Not on file    Highest education level: Not on file   Occupational History    Not on file   Tobacco Use    Smoking status: Never    Smokeless tobacco: Never   Substance and Sexual Activity    Alcohol use: No    Drug use: Yes     Types: Prescription    Sexual activity: Not on file   Other Topics Concern    Not on file   Social History Narrative    Not on file     Social Determinants of Health     Financial Resource Strain: Low Risk     Difficulty of Paying Living Expenses: Not hard at all   Food Insecurity: No Food Insecurity    Worried About Running Out of Food in the Last Year: Never true    Ran Out of Food in the Last Year: Never true   Transportation Needs: Not on file   Physical Activity: Not on file   Stress: Not on file   Social Connections: Not on file   Intimate Partner Violence: Not on file   Housing Stability: Not on file         Family History   Problem Relation Age of Onset    Cancer Brother         renal cell ca    Glaucoma Other     Breast Cancer Maternal Grandmother     Hypertension Mother     Breast Cancer Mother     Hypertension Father     Hypertension Other          No Known Allergies      Current Outpatient Medications   Medication Sig    simvastatin (ZOCOR) 40 MG tablet Take 1 tablet by mouth nightly    metoprolol succinate (TOPROL XL) 50 MG extended release tablet Take 1 tablet by mouth daily    Multiple Vitamin (MULTIVITAMIN ADULT PO) Take by mouth daily    aspirin 81 MG EC tablet Take 81 mg by mouth daily    ibuprofen (ADVIL;MOTRIN) 600 MG tablet Take 600 mg by mouth every 6 hours as needed    Testosterone (ANDROGEL) 20.25 MG/ACT (1.62%) GEL gel APPLY 2 DEPRESSIONS TO UPPER ARM IN THE MORNING BEFORE 9 AM     No current facility-administered medications for this visit. REVIEW OF SYSTEMS:   CONSTITUTIONAL:   There is no history of fever, chills, night sweats, weight loss, weight gain, persistent fatigue, or lethargy/hypersomnolence. CARDIAC:   No chest pain, pressure, discomfort, palpitations, orthopnea, murmurs, or edema. GI:   No dysphagia, heartburn reflux, nausea/vomiting, diarrhea, abdominal pain, or bleeding. NEURO:   There is no history of AMS, persistent headache, decreased level of consciousness, seizures, or motor or sensory deficits. PHYSICAL EXAM:    Vitals:    12/29/22 1519   BP: 126/70   Pulse: 73   Temp: 97.2 °F (36.2 °C)   SpO2: 93%        GENERAL APPEARANCE:   The patient is normal weight and in no respiratory distress. HEENT:   PERRL. Conjunctivae unremarkable. Nasal mucosa is without epistaxis, exudate, or polyps. Gums and dentition are unremarkable. There is  oropharyngeal narrowing. TMs are clear. NECK/LYMPHATIC:   Symmetrical with no elevation of jugular venous pulsation. Trachea midline. No thyroid enlargement. No cervical adenopathy. LUNGS:   Normal respiratory effort with symmetrical lung expansion. Breath sounds are clear bilaterally. HEART:   There is a regular rate and rhythm. No murmur, rub, or gallop. There is no edema in the lower extremities. ABDOMEN:   Soft and non-tender. No hepatosplenomegaly. Bowel sounds are normal.     NEURO:   The patient is alert and oriented to person, place, and time. Memory appears intact and mood is normal.  No gross sensorimotor deficits are present. ASSESSMENT:  (Medical Decision Making)      Diagnosis Orders   1. DINESH (obstructive sleep apnea), on CPAP at 8-15 cm with C-Flex of 2.   The patient compliance still suboptimal but he is using it more consistently. He is tolerating the CPAP better with the new mask. 2. Hypersomnia, subjectively still have daytime sleepiness but seems to be improving with the CPAP treatment       3. Restless legs syndrome (RLS), no longer has been using Lyrica            PLAN:    Continue CPAP at 8-15 cm with humidity and EPR 2    Continue proper sleep hygiene and positional therapy    Continue his medication as prescribed by another provider    Encouraged the patient to use a CPAP consistently every night when he is sleeping    Reviewed with him the inspire device mechanism and rationale to use it and he is concerning about his cardiac condition and want to hold on that for now    He will return to the sleep center in 4 months or sooner if needed    All questions and concerns were addressed properly      Over 50% of today's office visit was spent in face to face time reviewing test results, prognosis, importance of compliance, education about disease process, benefits of medications, instructions for management of acute flare-ups, and follow up plans. Total face to face time spent with patient including charting was 32 minutes.         Yvon Munroe MD  Electronically signed

## 2022-12-29 ENCOUNTER — OFFICE VISIT (OUTPATIENT)
Dept: SLEEP MEDICINE | Age: 72
End: 2022-12-29
Payer: MEDICARE

## 2022-12-29 VITALS
WEIGHT: 199.3 LBS | DIASTOLIC BLOOD PRESSURE: 70 MMHG | OXYGEN SATURATION: 93 % | HEIGHT: 72 IN | TEMPERATURE: 97.2 F | BODY MASS INDEX: 26.99 KG/M2 | HEART RATE: 73 BPM | SYSTOLIC BLOOD PRESSURE: 126 MMHG

## 2022-12-29 DIAGNOSIS — G47.10 HYPERSOMNIA: ICD-10-CM

## 2022-12-29 DIAGNOSIS — G47.33 OSA (OBSTRUCTIVE SLEEP APNEA): Primary | ICD-10-CM

## 2022-12-29 DIAGNOSIS — G25.81 RESTLESS LEGS SYNDROME (RLS): ICD-10-CM

## 2022-12-29 PROCEDURE — 3078F DIAST BP <80 MM HG: CPT | Performed by: INTERNAL MEDICINE

## 2022-12-29 PROCEDURE — 3074F SYST BP LT 130 MM HG: CPT | Performed by: INTERNAL MEDICINE

## 2022-12-29 PROCEDURE — 1036F TOBACCO NON-USER: CPT | Performed by: INTERNAL MEDICINE

## 2022-12-29 PROCEDURE — G8427 DOCREV CUR MEDS BY ELIG CLIN: HCPCS | Performed by: INTERNAL MEDICINE

## 2022-12-29 PROCEDURE — G8417 CALC BMI ABV UP PARAM F/U: HCPCS | Performed by: INTERNAL MEDICINE

## 2022-12-29 PROCEDURE — 99214 OFFICE O/P EST MOD 30 MIN: CPT | Performed by: INTERNAL MEDICINE

## 2022-12-29 PROCEDURE — 3017F COLORECTAL CA SCREEN DOC REV: CPT | Performed by: INTERNAL MEDICINE

## 2022-12-29 PROCEDURE — 1123F ACP DISCUSS/DSCN MKR DOCD: CPT | Performed by: INTERNAL MEDICINE

## 2022-12-29 PROCEDURE — G8484 FLU IMMUNIZE NO ADMIN: HCPCS | Performed by: INTERNAL MEDICINE

## 2022-12-29 ASSESSMENT — SLEEP AND FATIGUE QUESTIONNAIRES
HOW LIKELY ARE YOU TO NOD OFF OR FALL ASLEEP WHILE WATCHING TV: 3
HOW LIKELY ARE YOU TO NOD OFF OR FALL ASLEEP WHILE SITTING QUIETLY AFTER LUNCH WITHOUT ALCOHOL: 1
HOW LIKELY ARE YOU TO NOD OFF OR FALL ASLEEP WHILE LYING DOWN TO REST IN THE AFTERNOON WHEN CIRCUMSTANCES PERMIT: 3
ESS TOTAL SCORE: 14
HOW LIKELY ARE YOU TO NOD OFF OR FALL ASLEEP WHILE SITTING AND TALKING TO SOMEONE: 0
HOW LIKELY ARE YOU TO NOD OFF OR FALL ASLEEP IN A CAR, WHILE STOPPED FOR A FEW MINUTES IN TRAFFIC: 0
HOW LIKELY ARE YOU TO NOD OFF OR FALL ASLEEP WHILE SITTING AND READING: 2
HOW LIKELY ARE YOU TO NOD OFF OR FALL ASLEEP WHEN YOU ARE A PASSENGER IN A CAR FOR AN HOUR WITHOUT A BREAK: 3
HOW LIKELY ARE YOU TO NOD OFF OR FALL ASLEEP WHILE SITTING INACTIVE IN A PUBLIC PLACE: 2

## 2023-01-10 ENCOUNTER — NURSE ONLY (OUTPATIENT)
Dept: INTERNAL MEDICINE CLINIC | Facility: CLINIC | Age: 73
End: 2023-01-10

## 2023-01-10 DIAGNOSIS — E78.5 DYSLIPIDEMIA: ICD-10-CM

## 2023-01-11 ENCOUNTER — OFFICE VISIT (OUTPATIENT)
Dept: CARDIOLOGY CLINIC | Age: 73
End: 2023-01-11
Payer: MEDICARE

## 2023-01-11 VITALS
DIASTOLIC BLOOD PRESSURE: 80 MMHG | HEART RATE: 71 BPM | HEIGHT: 72 IN | WEIGHT: 199 LBS | BODY MASS INDEX: 26.95 KG/M2 | SYSTOLIC BLOOD PRESSURE: 135 MMHG

## 2023-01-11 DIAGNOSIS — I10 ESSENTIAL HYPERTENSION: Primary | ICD-10-CM

## 2023-01-11 DIAGNOSIS — I42.1 HOCM (HYPERTROPHIC OBSTRUCTIVE CARDIOMYOPATHY) (HCC): ICD-10-CM

## 2023-01-11 LAB — ALT SERPL-CCNC: 31 U/L (ref 12–65)

## 2023-01-11 PROCEDURE — G8417 CALC BMI ABV UP PARAM F/U: HCPCS | Performed by: INTERNAL MEDICINE

## 2023-01-11 PROCEDURE — 99213 OFFICE O/P EST LOW 20 MIN: CPT | Performed by: INTERNAL MEDICINE

## 2023-01-11 PROCEDURE — 3017F COLORECTAL CA SCREEN DOC REV: CPT | Performed by: INTERNAL MEDICINE

## 2023-01-11 PROCEDURE — 1123F ACP DISCUSS/DSCN MKR DOCD: CPT | Performed by: INTERNAL MEDICINE

## 2023-01-11 PROCEDURE — G8484 FLU IMMUNIZE NO ADMIN: HCPCS | Performed by: INTERNAL MEDICINE

## 2023-01-11 PROCEDURE — 3079F DIAST BP 80-89 MM HG: CPT | Performed by: INTERNAL MEDICINE

## 2023-01-11 PROCEDURE — 1036F TOBACCO NON-USER: CPT | Performed by: INTERNAL MEDICINE

## 2023-01-11 PROCEDURE — 3075F SYST BP GE 130 - 139MM HG: CPT | Performed by: INTERNAL MEDICINE

## 2023-01-11 PROCEDURE — G8427 DOCREV CUR MEDS BY ELIG CLIN: HCPCS | Performed by: INTERNAL MEDICINE

## 2023-01-11 RX ORDER — METOPROLOL SUCCINATE 100 MG/1
100 TABLET, EXTENDED RELEASE ORAL DAILY
Qty: 90 TABLET | Refills: 3 | Status: SHIPPED | OUTPATIENT
Start: 2023-01-11

## 2023-01-11 ASSESSMENT — ENCOUNTER SYMPTOMS
ORTHOPNEA: 0
HOARSE VOICE: 0
SHORTNESS OF BREATH: 0
HEMATOCHEZIA: 0
COLOR CHANGE: 0
HEMATEMESIS: 0
BLURRED VISION: 0
WHEEZING: 0
ABDOMINAL PAIN: 0
SPUTUM PRODUCTION: 0
BOWEL INCONTINENCE: 0
DIARRHEA: 0

## 2023-01-11 NOTE — PROGRESS NOTES
[facility] CARDIOLOGY  [address], [address]  PHONE: [phone]        23        NAME:  Jacque Downey. : 1950  MRN: [id number]       SUBJECTIVE:   Jacque Newton is a 81 y.o. male seen for a follow up visit regarding the following: HOCM. He has HOCM with hx ASA at [address]  He returns for scheduled follow up. He reports elevated ambulatory BP readings. Chief Complaint   Patient presents with    Hypertension     6 month follow up       HPI:    Hypertension  This is a chronic problem. The problem has been gradually worsening since onset. Pertinent negatives include no anxiety, blurred vision, chest pain, headaches, malaise/fatigue, neck pain, orthopnea, palpitations, peripheral edema, PND, shortness of breath or sweats. Past Medical History, Past Surgical History, Family history, Social History, and Medications were all reviewed with the patient today and updated as necessary. Current Outpatient Medications:     metoprolol succinate (TOPROL XL) 100 MG extended release tablet, Take 1 tablet by mouth daily, Disp: 90 tablet, Rfl: 3    simvastatin (ZOCOR) 40 MG tablet, Take 1 tablet by mouth nightly, Disp: 90 tablet, Rfl: 3    Multiple Vitamin (MULTIVITAMIN ADULT PO), Take by mouth daily, Disp: , Rfl:     aspirin 81 MG EC tablet, Take 81 mg by mouth daily, Disp: , Rfl:     ibuprofen (ADVIL;MOTRIN) 600 MG tablet, Take 600 mg by mouth every 6 hours as needed, Disp: , Rfl:     Testosterone (ANDROGEL) 20.25 MG/ACT (1.62%) GEL gel, APPLY 2 DEPRESSIONS TO UPPER ARM IN THE MORNING BEFORE 9 AM, Disp: , Rfl:   No Known Allergies  Past Medical History:   Diagnosis Date    Aortic regurgitation 2016    Moderate AI,LV EF=70.5%,normal LV size,moderate LVH,and mild diastolic dysfx. mild MR    Aortic valve disorders     Calculus of kidney     Cardiac murmur     Coronary artery disease due to lipid rich plaque 2018    Cysts of eyelids     Disturbance of skin sensation     Encounter for long-term (current) use of other medications     Essential hypertension 03/09/2017    Fatigue     GERD (gastroesophageal reflux disease)     Glaucoma     Headache(784.0)     HOCM (hypertrophic obstructive cardiomyopathy) (HCC)     Ill-defined condition     hereditary neuropathy    Impotence of organic origin     Insomnia, unspecified     MVP (mitral valve prolapse)     asymptomatic- dx 5-6 years ago    Nonrheumatic aortic valve insufficiency 10/05/2016    Nonspecific abnormal finding in stool contents     Other and unspecified hyperlipidemia     Other testicular hypofunction     Palpitations 04/18/2016    Personal history of kidney stones     Plantar fascial fibromatosis     PVC (premature ventricular contraction)     Restless legs syndrome (RLS)     Sleep apnea     cpap    SOB (shortness of breath)     Tension headache     Unspecified disorder of prostate      Past Surgical History:   Procedure Laterality Date    CARDIAC CATHETERIZATION Left 04/24/2018    LV EF=70%. LM:30% distal stenosis. LAD:10% luminal irregularities. RI:patent. LCX:20% mid & distal stenosis. RCA:50% mid stenosis with IFR:0.93. CARDIAC CATHETERIZATION  03/16/2020    LV EF:NL. LM:20-30% distal stenosis. LAD:20% ostial& mid stenosis. Dx1:20-30% proximal stenosis. LCX:20% proximal & mid stenosis. RCA:20% proximal,40-50% mid stenosi. IFR=0.95 of RCA.     HERNIA REPAIR      right inguinal hernia (Allscript says Bilateral)    LITHOTRIPSY      TX UNLISTED PROCEDURE CARDIAC SURGERY      ETOH Ablation    TESTICLE REMOVAL Left      Family History   Problem Relation Age of Onset    Cancer Brother         renal cell ca    Glaucoma Other     Breast Cancer Maternal Grandmother     Hypertension Mother     Breast Cancer Mother     Hypertension Father     Hypertension Other       Social History     Tobacco Use    Smoking status: Never    Smokeless tobacco: Never   Substance Use Topics    Alcohol use: No       ROS:    Review of Systems Constitutional: Negative for chills, decreased appetite, diaphoresis, fever and malaise/fatigue. HENT:  Negative for congestion, hearing loss, hoarse voice and nosebleeds. Eyes:  Negative for blurred vision. Cardiovascular:  Negative for chest pain, claudication, cyanosis, dyspnea on exertion, irregular heartbeat, leg swelling, near-syncope, orthopnea, palpitations, paroxysmal nocturnal dyspnea and syncope. Respiratory:  Negative for shortness of breath, sputum production and wheezing. Endocrine: Negative for polydipsia, polyphagia and polyuria. Skin:  Negative for color change. Musculoskeletal:  Negative for neck pain. Gastrointestinal:  Negative for abdominal pain, bowel incontinence, diarrhea, hematemesis and hematochezia. Genitourinary:  Negative for dysuria, frequency and hematuria. Neurological:  Negative for focal weakness, headaches, light-headedness, loss of balance, numbness, sensory change and weakness. Psychiatric/Behavioral:  Negative for altered mental status and memory loss. PHYSICAL EXAM:   /80   Pulse 71   Ht 6' (1.829 m)   Wt 199 lb (90.3 kg)   BMI 26.99 kg/m²      Physical Exam  Constitutional:       Appearance: Normal appearance. HENT:      Head: Normocephalic and atraumatic. Nose: Nose normal.   Eyes:      Extraocular Movements: Extraocular movements intact. Pupils: Pupils are equal, round, and reactive to light. Neck:      Vascular: No carotid bruit. Cardiovascular:      Rate and Rhythm: Regular rhythm. Pulses: Normal pulses. Heart sounds: Murmur (2/6 harsh SHARI) heard. Pulmonary:      Effort: Pulmonary effort is normal.      Breath sounds: Normal breath sounds. Abdominal:      General: Abdomen is flat. Bowel sounds are normal.      Palpations: Abdomen is soft. Musculoskeletal:         General: Normal range of motion. Cervical back: Normal range of motion and neck supple. Skin:     General: Skin is warm and dry. Neurological:      General: No focal deficit present. Mental Status: He is alert and oriented to person, place, and time. Psychiatric:         Mood and Affect: Mood normal.       Medical problems and test results were reviewed with the patient today. Recent Results (from the past 672 hour(s))   ALT    Collection Time: 01/10/23  8:09 AM   Result Value Ref Range    ALT 31 12 - 65 U/L     Lab Results   Component Value Date/Time    CHOL 200 11/22/2022 09:45 AM    HDL 44 11/22/2022 09:45 AM    VLDL 32 11/18/2021 09:21 AM     No results found for any visits on 01/11/23. ASSESSMENT and PLAN    [facility] was seen today for hypertension. Diagnoses and all orders for this visit:    Essential hypertension:Mildly elevated. Increase Toprol from 50 g daily to 100 mg daily. -     metoprolol succinate (TOPROL XL) 100 MG extended release tablet; Take 1 tablet by mouth daily    HOCM (hypertrophic obstructive cardiomyopathy) (HCC)  -     metoprolol succinate (TOPROL XL) 100 MG extended release tablet; Take 1 tablet by mouth daily        Disposition:    Return in about 4 weeks (around 2/8/2023) for Follow up after Med change.                 Franchesca Dumont MD  1/11/2023  4:55 PM

## 2023-02-13 ENCOUNTER — OFFICE VISIT (OUTPATIENT)
Dept: CARDIOLOGY CLINIC | Age: 73
End: 2023-02-13
Payer: MEDICARE

## 2023-02-13 VITALS
DIASTOLIC BLOOD PRESSURE: 52 MMHG | HEART RATE: 72 BPM | HEIGHT: 72 IN | WEIGHT: 201.2 LBS | SYSTOLIC BLOOD PRESSURE: 94 MMHG | BODY MASS INDEX: 27.25 KG/M2

## 2023-02-13 DIAGNOSIS — I42.1 HOCM (HYPERTROPHIC OBSTRUCTIVE CARDIOMYOPATHY) (HCC): ICD-10-CM

## 2023-02-13 DIAGNOSIS — I10 ESSENTIAL HYPERTENSION: Primary | ICD-10-CM

## 2023-02-13 PROCEDURE — G8484 FLU IMMUNIZE NO ADMIN: HCPCS | Performed by: INTERNAL MEDICINE

## 2023-02-13 PROCEDURE — 1123F ACP DISCUSS/DSCN MKR DOCD: CPT | Performed by: INTERNAL MEDICINE

## 2023-02-13 PROCEDURE — 1036F TOBACCO NON-USER: CPT | Performed by: INTERNAL MEDICINE

## 2023-02-13 PROCEDURE — 3074F SYST BP LT 130 MM HG: CPT | Performed by: INTERNAL MEDICINE

## 2023-02-13 PROCEDURE — G8427 DOCREV CUR MEDS BY ELIG CLIN: HCPCS | Performed by: INTERNAL MEDICINE

## 2023-02-13 PROCEDURE — 3017F COLORECTAL CA SCREEN DOC REV: CPT | Performed by: INTERNAL MEDICINE

## 2023-02-13 PROCEDURE — G8417 CALC BMI ABV UP PARAM F/U: HCPCS | Performed by: INTERNAL MEDICINE

## 2023-02-13 PROCEDURE — 99213 OFFICE O/P EST LOW 20 MIN: CPT | Performed by: INTERNAL MEDICINE

## 2023-02-13 PROCEDURE — 3078F DIAST BP <80 MM HG: CPT | Performed by: INTERNAL MEDICINE

## 2023-02-13 ASSESSMENT — ENCOUNTER SYMPTOMS
COLOR CHANGE: 0
HEMATEMESIS: 0
ABDOMINAL PAIN: 0
SPUTUM PRODUCTION: 0
BOWEL INCONTINENCE: 0
DIARRHEA: 0
BLURRED VISION: 0
WHEEZING: 0
SHORTNESS OF BREATH: 0
ORTHOPNEA: 0
HEMATOCHEZIA: 0
HOARSE VOICE: 0

## 2023-02-13 NOTE — PROGRESS NOTES
[facility] CARDIOLOGY  [address], [address]  PHONE: [phone]        23        NAME:  Misael Voss : 1950  MRN: [id number]       SUBJECTIVE:   Misael Voss is a 72 y.o. male seen for a follow up visit regarding the following: The patient has HOCM with ASA and primary hypertension. Last month,he reported elevated ambulatory BP. Metoprolol was increased. He returns for follow up after medication change. Chief Complaint   Patient presents with    Hypertension     4 week follow up       HPI:    Hypertension  This is a chronic problem. The problem has been gradually improving since onset. The problem is controlled. Pertinent negatives include no anxiety, blurred vision, chest pain, headaches, malaise/fatigue, neck pain, orthopnea, palpitations, peripheral edema, PND, shortness of breath or sweats. Past Medical History, Past Surgical History, Family history, Social History, and Medications were all reviewed with the patient today and updated as necessary. Current Outpatient Medications:     metoprolol succinate (TOPROL XL) 100 MG extended release tablet, Take 1 tablet by mouth daily, Disp: 90 tablet, Rfl: 3    simvastatin (ZOCOR) 40 MG tablet, Take 1 tablet by mouth nightly, Disp: 90 tablet, Rfl: 3    Multiple Vitamin (MULTIVITAMIN ADULT PO), Take by mouth daily, Disp: , Rfl:     aspirin 81 MG EC tablet, Take 81 mg by mouth daily, Disp: , Rfl:     ibuprofen (ADVIL;MOTRIN) 600 MG tablet, Take 600 mg by mouth every 6 hours as needed, Disp: , Rfl:     Testosterone (ANDROGEL) 20.25 MG/ACT (1.62%) GEL gel, APPLY 2 DEPRESSIONS TO UPPER ARM IN THE MORNING BEFORE 9 AM, Disp: , Rfl:   No Known Allergies  Past Medical History:   Diagnosis Date    Aortic regurgitation 2016    Moderate AI,LV EF=70.5%,normal LV size,moderate LVH,and mild diastolic dysfx. mild MR    Aortic valve disorders     Calculus of kidney     Cardiac murmur     Coronary artery disease due to lipid rich plaque 05/08/2018    Cysts of eyelids     Disturbance of skin sensation     Encounter for long-term (current) use of other medications     Essential hypertension 03/09/2017    Fatigue     GERD (gastroesophageal reflux disease)     Glaucoma     Headache(784.0)     HOCM (hypertrophic obstructive cardiomyopathy) (HCC)     Ill-defined condition     hereditary neuropathy    Impotence of organic origin     Insomnia, unspecified     MVP (mitral valve prolapse)     asymptomatic- dx 5-6 years ago    Nonrheumatic aortic valve insufficiency 10/05/2016    Nonspecific abnormal finding in stool contents     Other and unspecified hyperlipidemia     Other testicular hypofunction     Palpitations 04/18/2016    Personal history of kidney stones     Plantar fascial fibromatosis     PVC (premature ventricular contraction)     Restless legs syndrome (RLS)     Sleep apnea     cpap    SOB (shortness of breath)     Tension headache     Unspecified disorder of prostate      Past Surgical History:   Procedure Laterality Date    CARDIAC CATHETERIZATION Left 04/24/2018    LV EF=70%. LM:30% distal stenosis. LAD:10% luminal irregularities. RI:patent. LCX:20% mid & distal stenosis. RCA:50% mid stenosis with IFR:0.93. CARDIAC CATHETERIZATION  03/16/2020    LV EF:NL. LM:20-30% distal stenosis. LAD:20% ostial& mid stenosis. Dx1:20-30% proximal stenosis. LCX:20% proximal & mid stenosis. RCA:20% proximal,40-50% mid stenosi. IFR=0.95 of RCA.     HERNIA REPAIR      right inguinal hernia (Allscript says Bilateral)    LITHOTRIPSY      ID UNLISTED PROCEDURE CARDIAC SURGERY      ETOH Ablation    TESTICLE REMOVAL Left      Family History   Problem Relation Age of Onset    Cancer Brother         renal cell ca    Glaucoma Other     Breast Cancer Maternal Grandmother     Hypertension Mother     Breast Cancer Mother     Hypertension Father     Hypertension Other       Social History     Tobacco Use    Smoking status: Never    Smokeless tobacco: Never   Substance Use Topics    Alcohol use: No       ROS:    Review of Systems   Constitutional: Negative for chills, decreased appetite, diaphoresis, fever and malaise/fatigue. HENT:  Negative for congestion, hearing loss, hoarse voice and nosebleeds. Eyes:  Negative for blurred vision. Cardiovascular:  Negative for chest pain, claudication, cyanosis, dyspnea on exertion, irregular heartbeat, leg swelling, near-syncope, orthopnea, palpitations, paroxysmal nocturnal dyspnea and syncope. Respiratory:  Negative for shortness of breath, sputum production and wheezing. Endocrine: Negative for polydipsia, polyphagia and polyuria. Skin:  Negative for color change. Musculoskeletal:  Negative for neck pain. Gastrointestinal:  Negative for abdominal pain, bowel incontinence, diarrhea, hematemesis and hematochezia. Genitourinary:  Negative for dysuria, frequency and hematuria. Neurological:  Negative for focal weakness, headaches, light-headedness, loss of balance, numbness, sensory change and weakness. Psychiatric/Behavioral:  Negative for altered mental status and memory loss. PHYSICAL EXAM:   BP (!) 94/52   Pulse 72   Ht 6' (1.829 m)   Wt 201 lb 3.2 oz (91.3 kg)   BMI 27.29 kg/m²      Physical Exam  Constitutional:       Appearance: Normal appearance. HENT:      Head: Normocephalic and atraumatic. Nose: Nose normal.   Eyes:      Extraocular Movements: Extraocular movements intact. Pupils: Pupils are equal, round, and reactive to light. Neck:      Vascular: No carotid bruit. Cardiovascular:      Rate and Rhythm: Regular rhythm. Pulses: Normal pulses. Heart sounds: Murmur (2/6 SHARI) heard. Pulmonary:      Effort: Pulmonary effort is normal.      Breath sounds: Normal breath sounds. Abdominal:      General: Abdomen is flat. Bowel sounds are normal.      Palpations: Abdomen is soft. Musculoskeletal:         General: Normal range of motion.       Cervical back: Normal range of motion and neck supple. Skin:     General: Skin is warm and dry. Neurological:      General: No focal deficit present. Mental Status: He is alert and oriented to person, place, and time. Psychiatric:         Mood and Affect: Mood normal.       Medical problems and test results were reviewed with the patient today. No results found for this or any previous visit (from the past 672 hour(s)). Lab Results   Component Value Date/Time    CHOL 200 11/22/2022 09:45 AM    HDL 44 11/22/2022 09:45 AM    VLDL 32 11/18/2021 09:21 AM     No results found for any visits on 02/13/23. ASSESSMENT and PLAN    Mike Montiel was seen today for hypertension. Diagnoses and all orders for this visit:    Essential hypertension:Continue Metoprolol 100 mg daily. HOCM (hypertrophic obstructive cardiomyopathy) (HCC):Echo scheduled at [address] in 7-2023. Continue Metoprolol. Disposition:    Return in about 6 months (around 8/13/2023).                 Kaylah Rosado MD  2/13/2023  4:23 PM

## 2023-03-30 NOTE — PROGRESS NOTES
[address] [place] [address] Court, [address] [facility]  [phone]    Patient Name:  Geoffrey Ramirez. YOB: 1950      Office Visit 3/31/2023    CHIEF COMPLAINT:    Chief Complaint   Patient presents with    Follow-up             HISTORY OF PRESENT ILLNESS:         This patient was seen today for follow-up of sleep apnea and restless leg syndrome. He was diagnosed with a sleep disordered breathing in 2016 and his AHI was 31.6/hour  and the lowest oxygen saturation was 87%. Subsequently he was started on auto CPAP at 8-15 cm with heated humidity using comfort gel mask. He had a replacement machine since his previous machine was in the recall list.  He is using a new mask which is a full facemask and he is feeling much better with that. He is using and benefiting from his CPAP machine on a daily basis and his average daily use is 5 hours and 33 minutes. His AHI is 6.6/hour and his average pressure requirement is 9.7 cm. Of note that his restless leg  was treated previously with Lyrica but currently is off that. His last ferritin level last year was 46. He has some question regarding inspire device which was discussed with him previously but he want to hold on that since he is tolerating CPAP better with the newer mask. Indicated that he is having some difficulty with the humidifier part of his machine which seems to run out of the water quickly and has a burning smell and sensation toward the end of the night. We will continue his setting at the same level of 8-15 cm and adjust the ramp time to 15 minutes at his request and asked the [employer] company to assist in evaluating his a humidifier to make sure there is no other problem. In the meantime we will renew his mask and supplies order. The Hyrum score today continue to be elevated at 12/24. This is a slightly better than last office visit which was 14/24.           Sleep Medicine 3/31/2023 12/29/2022 9/22/2022

## 2023-03-31 ENCOUNTER — OFFICE VISIT (OUTPATIENT)
Dept: SLEEP MEDICINE | Age: 73
End: 2023-03-31
Payer: MEDICARE

## 2023-03-31 VITALS
HEIGHT: 72 IN | WEIGHT: 195 LBS | SYSTOLIC BLOOD PRESSURE: 108 MMHG | BODY MASS INDEX: 26.41 KG/M2 | DIASTOLIC BLOOD PRESSURE: 58 MMHG | HEART RATE: 64 BPM | TEMPERATURE: 97.9 F | OXYGEN SATURATION: 95 % | RESPIRATION RATE: 16 BRPM

## 2023-03-31 DIAGNOSIS — G47.33 OSA (OBSTRUCTIVE SLEEP APNEA): Primary | ICD-10-CM

## 2023-03-31 DIAGNOSIS — G47.10 HYPERSOMNIA: ICD-10-CM

## 2023-03-31 DIAGNOSIS — G25.81 RESTLESS LEGS SYNDROME (RLS): ICD-10-CM

## 2023-03-31 PROCEDURE — 1123F ACP DISCUSS/DSCN MKR DOCD: CPT | Performed by: INTERNAL MEDICINE

## 2023-03-31 PROCEDURE — G8417 CALC BMI ABV UP PARAM F/U: HCPCS | Performed by: INTERNAL MEDICINE

## 2023-03-31 PROCEDURE — 3017F COLORECTAL CA SCREEN DOC REV: CPT | Performed by: INTERNAL MEDICINE

## 2023-03-31 PROCEDURE — G8427 DOCREV CUR MEDS BY ELIG CLIN: HCPCS | Performed by: INTERNAL MEDICINE

## 2023-03-31 PROCEDURE — 1036F TOBACCO NON-USER: CPT | Performed by: INTERNAL MEDICINE

## 2023-03-31 PROCEDURE — 3074F SYST BP LT 130 MM HG: CPT | Performed by: INTERNAL MEDICINE

## 2023-03-31 PROCEDURE — 3078F DIAST BP <80 MM HG: CPT | Performed by: INTERNAL MEDICINE

## 2023-03-31 PROCEDURE — G8484 FLU IMMUNIZE NO ADMIN: HCPCS | Performed by: INTERNAL MEDICINE

## 2023-03-31 PROCEDURE — 99214 OFFICE O/P EST MOD 30 MIN: CPT | Performed by: INTERNAL MEDICINE

## 2023-03-31 ASSESSMENT — SLEEP AND FATIGUE QUESTIONNAIRES
HOW LIKELY ARE YOU TO NOD OFF OR FALL ASLEEP WHILE SITTING AND READING: 2
HOW LIKELY ARE YOU TO NOD OFF OR FALL ASLEEP WHILE LYING DOWN TO REST IN THE AFTERNOON WHEN CIRCUMSTANCES PERMIT: 3
ESS TOTAL SCORE: 12
HOW LIKELY ARE YOU TO NOD OFF OR FALL ASLEEP IN A CAR, WHILE STOPPED FOR A FEW MINUTES IN TRAFFIC: 0
HOW LIKELY ARE YOU TO NOD OFF OR FALL ASLEEP WHILE SITTING INACTIVE IN A PUBLIC PLACE: 3
HOW LIKELY ARE YOU TO NOD OFF OR FALL ASLEEP WHILE WATCHING TV: 2
HOW LIKELY ARE YOU TO NOD OFF OR FALL ASLEEP WHEN YOU ARE A PASSENGER IN A CAR FOR AN HOUR WITHOUT A BREAK: 0
HOW LIKELY ARE YOU TO NOD OFF OR FALL ASLEEP WHILE SITTING QUIETLY AFTER LUNCH WITHOUT ALCOHOL: 2
HOW LIKELY ARE YOU TO NOD OFF OR FALL ASLEEP WHILE SITTING AND TALKING TO SOMEONE: 0

## 2023-11-24 ASSESSMENT — PATIENT HEALTH QUESTIONNAIRE - PHQ9
SUM OF ALL RESPONSES TO PHQ QUESTIONS 1-9: 0
2. FEELING DOWN, DEPRESSED OR HOPELESS: NOT AT ALL
1. LITTLE INTEREST OR PLEASURE IN DOING THINGS: NOT AT ALL
SUM OF ALL RESPONSES TO PHQ9 QUESTIONS 1 & 2: 0
SUM OF ALL RESPONSES TO PHQ QUESTIONS 1-9: 0
2. FEELING DOWN, DEPRESSED OR HOPELESS: 0
SUM OF ALL RESPONSES TO PHQ QUESTIONS 1-9: 0
SUM OF ALL RESPONSES TO PHQ9 QUESTIONS 1 & 2: 0
1. LITTLE INTEREST OR PLEASURE IN DOING THINGS: 0
SUM OF ALL RESPONSES TO PHQ QUESTIONS 1-9: 0

## 2023-11-27 ENCOUNTER — TELEPHONE (OUTPATIENT)
Dept: INTERNAL MEDICINE CLINIC | Facility: CLINIC | Age: 73
End: 2023-11-27

## 2023-11-27 ENCOUNTER — OFFICE VISIT (OUTPATIENT)
Dept: INTERNAL MEDICINE CLINIC | Facility: CLINIC | Age: 73
End: 2023-11-27
Payer: MEDICARE

## 2023-11-27 VITALS
WEIGHT: 196 LBS | OXYGEN SATURATION: 95 % | DIASTOLIC BLOOD PRESSURE: 61 MMHG | HEART RATE: 64 BPM | SYSTOLIC BLOOD PRESSURE: 115 MMHG | BODY MASS INDEX: 26.55 KG/M2 | HEIGHT: 72 IN | TEMPERATURE: 98.1 F

## 2023-11-27 DIAGNOSIS — R35.1 NOCTURIA: ICD-10-CM

## 2023-11-27 DIAGNOSIS — I25.83 CORONARY ARTERY DISEASE DUE TO LIPID RICH PLAQUE: Primary | ICD-10-CM

## 2023-11-27 DIAGNOSIS — G47.33 OSA (OBSTRUCTIVE SLEEP APNEA): ICD-10-CM

## 2023-11-27 DIAGNOSIS — I42.1 HOCM (HYPERTROPHIC OBSTRUCTIVE CARDIOMYOPATHY) (HCC): ICD-10-CM

## 2023-11-27 DIAGNOSIS — M25.561 CHRONIC PAIN OF RIGHT KNEE: ICD-10-CM

## 2023-11-27 DIAGNOSIS — E78.5 DYSLIPIDEMIA: ICD-10-CM

## 2023-11-27 DIAGNOSIS — I25.10 CORONARY ARTERY DISEASE DUE TO LIPID RICH PLAQUE: Primary | ICD-10-CM

## 2023-11-27 DIAGNOSIS — I10 ESSENTIAL HYPERTENSION: ICD-10-CM

## 2023-11-27 DIAGNOSIS — R79.89 LOW TESTOSTERONE: ICD-10-CM

## 2023-11-27 DIAGNOSIS — G89.29 CHRONIC PAIN OF RIGHT KNEE: ICD-10-CM

## 2023-11-27 DIAGNOSIS — Z12.11 COLON CANCER SCREENING: ICD-10-CM

## 2023-11-27 LAB
ALBUMIN SERPL-MCNC: 4.1 G/DL (ref 3.2–4.6)
ALBUMIN/GLOB SERPL: 1.3 (ref 0.4–1.6)
ALP SERPL-CCNC: 80 U/L (ref 50–136)
ALT SERPL-CCNC: 30 U/L (ref 12–65)
ANION GAP SERPL CALC-SCNC: 5 MMOL/L (ref 2–11)
AST SERPL-CCNC: 21 U/L (ref 15–37)
BASOPHILS # BLD: 0.1 K/UL (ref 0–0.2)
BASOPHILS NFR BLD: 1 % (ref 0–2)
BILIRUB SERPL-MCNC: 0.8 MG/DL (ref 0.2–1.1)
BUN SERPL-MCNC: 14 MG/DL (ref 8–23)
CALCIUM SERPL-MCNC: 9.6 MG/DL (ref 8.3–10.4)
CHLORIDE SERPL-SCNC: 106 MMOL/L (ref 101–110)
CHOLEST SERPL-MCNC: 227 MG/DL
CO2 SERPL-SCNC: 26 MMOL/L (ref 21–32)
CREAT SERPL-MCNC: 1.1 MG/DL (ref 0.8–1.5)
DIFFERENTIAL METHOD BLD: NORMAL
EOSINOPHIL # BLD: 0.2 K/UL (ref 0–0.8)
EOSINOPHIL NFR BLD: 3 % (ref 0.5–7.8)
ERYTHROCYTE [DISTWIDTH] IN BLOOD BY AUTOMATED COUNT: 14 % (ref 11.9–14.6)
GLOBULIN SER CALC-MCNC: 3.2 G/DL (ref 2.8–4.5)
GLUCOSE SERPL-MCNC: 91 MG/DL (ref 65–100)
HCT VFR BLD AUTO: 46.5 % (ref 41.1–50.3)
HDLC SERPL-MCNC: 42 MG/DL (ref 40–60)
HDLC SERPL: 5.4
HGB BLD-MCNC: 15.3 G/DL (ref 13.6–17.2)
IMM GRANULOCYTES # BLD AUTO: 0 K/UL (ref 0–0.5)
IMM GRANULOCYTES NFR BLD AUTO: 0 % (ref 0–5)
LDLC SERPL CALC-MCNC: ABNORMAL MG/DL
LDLC SERPL DIRECT ASSAY-MCNC: 133 MG/DL
LYMPHOCYTES # BLD: 1.6 K/UL (ref 0.5–4.6)
LYMPHOCYTES NFR BLD: 26 % (ref 13–44)
MCH RBC QN AUTO: 29.2 PG (ref 26.1–32.9)
MCHC RBC AUTO-ENTMCNC: 32.9 G/DL (ref 31.4–35)
MCV RBC AUTO: 88.7 FL (ref 82–102)
MONOCYTES # BLD: 0.5 K/UL (ref 0.1–1.3)
MONOCYTES NFR BLD: 8 % (ref 4–12)
NEUTS SEG # BLD: 3.8 K/UL (ref 1.7–8.2)
NEUTS SEG NFR BLD: 62 % (ref 43–78)
NRBC # BLD: 0 K/UL (ref 0–0.2)
PLATELET # BLD AUTO: 188 K/UL (ref 150–450)
PMV BLD AUTO: 12.3 FL (ref 9.4–12.3)
POTASSIUM SERPL-SCNC: 4.2 MMOL/L (ref 3.5–5.1)
PROT SERPL-MCNC: 7.3 G/DL (ref 6.3–8.2)
PSA SERPL-MCNC: 0.6 NG/ML
RBC # BLD AUTO: 5.24 M/UL (ref 4.23–5.6)
SODIUM SERPL-SCNC: 137 MMOL/L (ref 133–143)
TRIGL SERPL-MCNC: 421 MG/DL (ref 35–150)
TSH W FREE THYROID IF ABNORMAL: 3.02 UIU/ML (ref 0.36–3.74)
VLDLC SERPL CALC-MCNC: 84.2 MG/DL (ref 6–23)
WBC # BLD AUTO: 6.2 K/UL (ref 4.3–11.1)

## 2023-11-27 PROCEDURE — 1036F TOBACCO NON-USER: CPT | Performed by: INTERNAL MEDICINE

## 2023-11-27 PROCEDURE — G8427 DOCREV CUR MEDS BY ELIG CLIN: HCPCS | Performed by: INTERNAL MEDICINE

## 2023-11-27 PROCEDURE — 3074F SYST BP LT 130 MM HG: CPT | Performed by: INTERNAL MEDICINE

## 2023-11-27 PROCEDURE — G8417 CALC BMI ABV UP PARAM F/U: HCPCS | Performed by: INTERNAL MEDICINE

## 2023-11-27 PROCEDURE — 3017F COLORECTAL CA SCREEN DOC REV: CPT | Performed by: INTERNAL MEDICINE

## 2023-11-27 PROCEDURE — 3078F DIAST BP <80 MM HG: CPT | Performed by: INTERNAL MEDICINE

## 2023-11-27 PROCEDURE — 99214 OFFICE O/P EST MOD 30 MIN: CPT | Performed by: INTERNAL MEDICINE

## 2023-11-27 PROCEDURE — G8484 FLU IMMUNIZE NO ADMIN: HCPCS | Performed by: INTERNAL MEDICINE

## 2023-11-27 PROCEDURE — 1123F ACP DISCUSS/DSCN MKR DOCD: CPT | Performed by: INTERNAL MEDICINE

## 2023-11-27 RX ORDER — SIMVASTATIN 40 MG
40 TABLET ORAL NIGHTLY
Qty: 90 TABLET | Refills: 3 | Status: SHIPPED | OUTPATIENT
Start: 2023-11-27

## 2023-11-27 RX ORDER — IBUPROFEN 600 MG/1
600 TABLET ORAL EVERY 6 HOURS PRN
Qty: 120 TABLET | Refills: 3 | Status: SHIPPED | OUTPATIENT
Start: 2023-11-27

## 2023-11-27 RX ORDER — TESTOSTERONE 16.2 MG/G
2 GEL TRANSDERMAL DAILY
Qty: 75 G | Refills: 2 | Status: SHIPPED | OUTPATIENT
Start: 2023-11-27 | End: 2024-11-26

## 2023-11-27 RX ORDER — METOPROLOL SUCCINATE 100 MG/1
100 TABLET, EXTENDED RELEASE ORAL DAILY
Qty: 90 TABLET | Refills: 3 | Status: SHIPPED | OUTPATIENT
Start: 2023-11-27

## 2023-11-27 SDOH — ECONOMIC STABILITY: FOOD INSECURITY: WITHIN THE PAST 12 MONTHS, THE FOOD YOU BOUGHT JUST DIDN'T LAST AND YOU DIDN'T HAVE MONEY TO GET MORE.: NEVER TRUE

## 2023-11-27 SDOH — ECONOMIC STABILITY: FOOD INSECURITY: WITHIN THE PAST 12 MONTHS, YOU WORRIED THAT YOUR FOOD WOULD RUN OUT BEFORE YOU GOT MONEY TO BUY MORE.: NEVER TRUE

## 2023-11-27 SDOH — ECONOMIC STABILITY: HOUSING INSECURITY
IN THE LAST 12 MONTHS, WAS THERE A TIME WHEN YOU DID NOT HAVE A STEADY PLACE TO SLEEP OR SLEPT IN A SHELTER (INCLUDING NOW)?: NO

## 2023-11-27 SDOH — ECONOMIC STABILITY: INCOME INSECURITY: HOW HARD IS IT FOR YOU TO PAY FOR THE VERY BASICS LIKE FOOD, HOUSING, MEDICAL CARE, AND HEATING?: SOMEWHAT HARD

## 2023-11-27 ASSESSMENT — LIFESTYLE VARIABLES
HOW OFTEN DO YOU HAVE A DRINK CONTAINING ALCOHOL: MONTHLY OR LESS
HOW MANY STANDARD DRINKS CONTAINING ALCOHOL DO YOU HAVE ON A TYPICAL DAY: 1 OR 2

## 2023-11-27 ASSESSMENT — PATIENT HEALTH QUESTIONNAIRE - PHQ9
SUM OF ALL RESPONSES TO PHQ QUESTIONS 1-9: 0
1. LITTLE INTEREST OR PLEASURE IN DOING THINGS: 0
SUM OF ALL RESPONSES TO PHQ QUESTIONS 1-9: 0
SUM OF ALL RESPONSES TO PHQ9 QUESTIONS 1 & 2: 0
2. FEELING DOWN, DEPRESSED OR HOPELESS: 0
SUM OF ALL RESPONSES TO PHQ QUESTIONS 1-9: 0
SUM OF ALL RESPONSES TO PHQ QUESTIONS 1-9: 0

## 2023-11-27 NOTE — TELEPHONE ENCOUNTER
Patient wanted to know if today was suppose to be his medicare wellness appointment with the questions. He said he was not asked

## 2023-11-28 ENCOUNTER — TELEPHONE (OUTPATIENT)
Dept: INTERNAL MEDICINE CLINIC | Facility: CLINIC | Age: 73
End: 2023-11-28

## 2023-11-28 LAB — TESTOST SERPL-MCNC: 252 NG/DL (ref 264–916)

## 2023-11-28 NOTE — TELEPHONE ENCOUNTER
----- Message from Charu Thompson MD sent at 11/28/2023  1:30 PM EST -----  Recent labs were reviewed. Glucose/Blood sugar was normal.  Kidney function, liver function and thyroid function  were normal.    Cholesterol is elevated. Are you still doing the Mediterranean Diet? Needs to resume if not. Taking zocor every evening?   Charu Thompson MD

## 2023-11-28 NOTE — RESULT ENCOUNTER NOTE
Recent labs were reviewed. Glucose/Blood sugar was normal.  Kidney function, liver function and thyroid function  were normal.    Cholesterol is elevated. Are you still doing the Mediterranean Diet? Needs to resume if not. Taking zocor every evening?   Christiane Clark MD

## 2023-11-28 NOTE — TELEPHONE ENCOUNTER
Pt given results and relayed understanding. Taking Zocor nightly, but is not on mediterranean diet . He is going to resume this in January.

## 2023-12-04 ENCOUNTER — TELEPHONE (OUTPATIENT)
Dept: INTERNAL MEDICINE CLINIC | Facility: CLINIC | Age: 73
End: 2023-12-04

## 2023-12-04 ASSESSMENT — ENCOUNTER SYMPTOMS
ABDOMINAL PAIN: 0
COUGH: 0
SHORTNESS OF BREATH: 0

## 2023-12-04 NOTE — TELEPHONE ENCOUNTER
----- Message from Josh Boss MD sent at 12/4/2023  7:48 AM EST -----  Testosterone level was a little low. Have you been applying daily?   Josh Boss MD

## 2023-12-04 NOTE — TELEPHONE ENCOUNTER
Pt given results. He does report that most of the time he takes gel as prescribed but he does miss doses sometimes.

## 2024-01-05 ENCOUNTER — TELEPHONE (OUTPATIENT)
Dept: GASTROENTEROLOGY | Age: 74
End: 2024-01-05

## 2024-01-05 ENCOUNTER — PREP FOR PROCEDURE (OUTPATIENT)
Dept: GASTROENTEROLOGY | Age: 74
End: 2024-01-05

## 2024-01-05 DIAGNOSIS — Z12.11 SCREEN FOR COLON CANCER: ICD-10-CM

## 2024-01-06 RX ORDER — SODIUM CHLORIDE 9 MG/ML
25 INJECTION, SOLUTION INTRAVENOUS PRN
Status: CANCELLED | OUTPATIENT
Start: 2024-01-06

## 2024-01-06 RX ORDER — SODIUM CHLORIDE 0.9 % (FLUSH) 0.9 %
5-40 SYRINGE (ML) INJECTION PRN
Status: CANCELLED | OUTPATIENT
Start: 2024-01-06

## 2024-01-06 RX ORDER — SODIUM CHLORIDE 0.9 % (FLUSH) 0.9 %
5-40 SYRINGE (ML) INJECTION EVERY 12 HOURS SCHEDULED
Status: CANCELLED | OUTPATIENT
Start: 2024-01-06

## 2024-01-17 ENCOUNTER — OFFICE VISIT (OUTPATIENT)
Age: 74
End: 2024-01-17

## 2024-01-17 VITALS
WEIGHT: 206.4 LBS | HEIGHT: 72 IN | HEART RATE: 64 BPM | SYSTOLIC BLOOD PRESSURE: 120 MMHG | DIASTOLIC BLOOD PRESSURE: 58 MMHG | BODY MASS INDEX: 27.96 KG/M2

## 2024-01-17 DIAGNOSIS — I25.83 CORONARY ARTERY DISEASE DUE TO LIPID RICH PLAQUE: ICD-10-CM

## 2024-01-17 DIAGNOSIS — I25.10 CORONARY ARTERY DISEASE DUE TO LIPID RICH PLAQUE: ICD-10-CM

## 2024-01-17 DIAGNOSIS — E78.5 DYSLIPIDEMIA: ICD-10-CM

## 2024-01-17 DIAGNOSIS — I10 ESSENTIAL HYPERTENSION: Primary | ICD-10-CM

## 2024-01-17 DIAGNOSIS — I42.1 HOCM (HYPERTROPHIC OBSTRUCTIVE CARDIOMYOPATHY) (HCC): ICD-10-CM

## 2024-01-17 ASSESSMENT — ENCOUNTER SYMPTOMS
SHORTNESS OF BREATH: 0
ABDOMINAL PAIN: 0
BLURRED VISION: 0
BOWEL INCONTINENCE: 0
HEMATEMESIS: 0
WHEEZING: 0
HOARSE VOICE: 0
DIARRHEA: 0
HEMATOCHEZIA: 0
SPUTUM PRODUCTION: 0
ORTHOPNEA: 0
COLOR CHANGE: 0

## 2024-01-17 NOTE — PROGRESS NOTES
[facility] CARDIOLOGY  [address] [place] [address], [address]  [place] [address]  PHONE: [phone]        24        NAME:  Brent Lamas Jr.  : 1950  MRN: [id number]       SUBJECTIVE:   Brent Lamas Jr. is a 74 y.o. male seen for a follow up visit regarding the following: The patient has HOCM with ASA at [facility], and primary hypertension.He returns for annual follow up.Overall,he reports doing well except for fatigue.He states that he walks 18 hole og golf and is exhausted afterwards.He has an annual echo scheduled at [facility] in 2024.    Chief Complaint   Patient presents with    Hypertension       HPI:    Hypertension  This is a chronic problem. The problem is unchanged. The problem is controlled. Associated symptoms include malaise/fatigue. Pertinent negatives include no anxiety, blurred vision, chest pain, headaches, neck pain, orthopnea, palpitations, peripheral edema, PND, shortness of breath or sweats.       Past Medical History, Past Surgical History, Family history, Social History, and Medications were all reviewed with the patient today and updated as necessary.         Current Outpatient Medications:     ibuprofen (ADVIL;MOTRIN) 600 MG tablet, Take 1 tablet by mouth every 6 hours as needed for Pain, Disp: 120 tablet, Rfl: 3    metoprolol succinate (TOPROL XL) 100 MG extended release tablet, Take 1 tablet by mouth daily, Disp: 90 tablet, Rfl: 3    simvastatin (ZOCOR) 40 MG tablet, Take 1 tablet by mouth nightly, Disp: 90 tablet, Rfl: 3    Testosterone (ANDROGEL) 20.25 MG/ACT (1.62%) GEL gel, Place 2 actuation onto the skin daily. APPLY 2 DEPRESSIONS TO UPPER ARM IN THE MORNING BEFORE 9 AM Max Daily Amount: 40.5 mg, Disp: 75 g, Rfl: 2    Multiple Vitamin (MULTIVITAMIN ADULT PO), Take by mouth daily, Disp: , Rfl:     aspirin 81 MG EC tablet, Take 1 tablet by mouth daily, Disp: , Rfl:   No Known Allergies  Past Medical History:   Diagnosis Date    Aortic regurgitation 2016    Moderate

## 2024-01-22 NOTE — PERIOP NOTE
Patient verified name, , and procedure.    Type: 1a; abbreviated assessment per anesthesia guidelines    Labs per anesthesia: none    Instructed pt that they will be notified the day before their procedure by the GI Lab for time of arrival if their procedure is [place] and Pre-op for [place] cases. Arrival times should be called by 5 pm. If no phone is received the patient should contact their respective hospital. The GI lab telephone number is [phone] and ES Pre-op is [phone].     Follow diet and prep instructions per office including NPO status.  If patient has NOT received instructions from office patient is advised to call surgeon office, verbalizes understanding.    Bath or shower the night before and the am of surgery with non-moisturizing soap. No lotions, oils, powders, cologne on skin. No make up, eye make up or jewelry. Wear loose fitting comfortable, clean clothing.     Must have adult present in building the entire time .     Medications for the day of procedure Metoprolol (Lopressor).     The following discharge instructions reviewed with patient: medication given during procedure may cause drowsiness for several hours, therefore, do not drive or operate machinery for remainder of the day. You may not drink alcohol on the day of your procedure, please resume regular diet and activity unless otherwise directed. You may experience abdominal distention for several hours that is relieved by the passage of gas. Contact your physician if you have any of the following: fever or chills, severe abdominal pain or excessive amount of bleeding or a large amount when having a bowel movement. Occasional specks of blood with bowel movement would not be unusual.

## 2024-01-22 NOTE — PROGRESS NOTES
RN called Pt to confirm appointment time of 0942, arrival time 0810, location,  requirement, and instructions for registration at the hospital.  Pt verbalized understanding.

## 2024-01-23 ENCOUNTER — ANESTHESIA EVENT (OUTPATIENT)
Dept: ENDOSCOPY | Age: 74
End: 2024-01-23
Payer: MEDICARE

## 2024-01-23 ENCOUNTER — HOSPITAL ENCOUNTER (OUTPATIENT)
Age: 74
Setting detail: OUTPATIENT SURGERY
Discharge: HOME | End: 2024-01-23
Attending: STUDENT IN AN ORGANIZED HEALTH CARE EDUCATION/TRAINING PROGRAM | Admitting: STUDENT IN AN ORGANIZED HEALTH CARE EDUCATION/TRAINING PROGRAM
Payer: MEDICARE

## 2024-01-23 ENCOUNTER — ANESTHESIA (OUTPATIENT)
Dept: ENDOSCOPY | Age: 74
End: 2024-01-23
Payer: MEDICARE

## 2024-01-23 VITALS
DIASTOLIC BLOOD PRESSURE: 74 MMHG | HEIGHT: 72 IN | HEART RATE: 61 BPM | RESPIRATION RATE: 18 BRPM | WEIGHT: 198 LBS | SYSTOLIC BLOOD PRESSURE: 158 MMHG | BODY MASS INDEX: 26.82 KG/M2 | OXYGEN SATURATION: 97 % | TEMPERATURE: 98 F

## 2024-01-23 PROCEDURE — 2709999900 HC NON-CHARGEABLE SUPPLY: Performed by: STUDENT IN AN ORGANIZED HEALTH CARE EDUCATION/TRAINING PROGRAM

## 2024-01-23 PROCEDURE — 7100000010 HC PHASE II RECOVERY - FIRST 15 MIN: Performed by: STUDENT IN AN ORGANIZED HEALTH CARE EDUCATION/TRAINING PROGRAM

## 2024-01-23 PROCEDURE — 88305 TISSUE EXAM BY PATHOLOGIST: CPT

## 2024-01-23 PROCEDURE — 2500000003 HC RX 250 WO HCPCS: Performed by: NURSE ANESTHETIST, CERTIFIED REGISTERED

## 2024-01-23 PROCEDURE — 6360000002 HC RX W HCPCS: Performed by: NURSE ANESTHETIST, CERTIFIED REGISTERED

## 2024-01-23 PROCEDURE — 2580000003 HC RX 258: Performed by: ANESTHESIOLOGY

## 2024-01-23 PROCEDURE — 3609027000 HC COLONOSCOPY: Performed by: STUDENT IN AN ORGANIZED HEALTH CARE EDUCATION/TRAINING PROGRAM

## 2024-01-23 PROCEDURE — 7100000011 HC PHASE II RECOVERY - ADDTL 15 MIN: Performed by: STUDENT IN AN ORGANIZED HEALTH CARE EDUCATION/TRAINING PROGRAM

## 2024-01-23 PROCEDURE — 3700000000 HC ANESTHESIA ATTENDED CARE: Performed by: STUDENT IN AN ORGANIZED HEALTH CARE EDUCATION/TRAINING PROGRAM

## 2024-01-23 PROCEDURE — 2500000003 HC RX 250 WO HCPCS

## 2024-01-23 PROCEDURE — 3700000001 HC ADD 15 MINUTES (ANESTHESIA): Performed by: STUDENT IN AN ORGANIZED HEALTH CARE EDUCATION/TRAINING PROGRAM

## 2024-01-23 RX ORDER — LIDOCAINE HYDROCHLORIDE 20 MG/ML
INJECTION, SOLUTION EPIDURAL; INFILTRATION; INTRACAUDAL; PERINEURAL PRN
Status: DISCONTINUED | OUTPATIENT
Start: 2024-01-23 | End: 2024-01-23 | Stop reason: SDUPTHER

## 2024-01-23 RX ORDER — SODIUM CHLORIDE 0.9 % (FLUSH) 0.9 %
5-40 SYRINGE (ML) INJECTION PRN
Status: DISCONTINUED | OUTPATIENT
Start: 2024-01-23 | End: 2024-01-23 | Stop reason: HOSPADM

## 2024-01-23 RX ORDER — SODIUM CHLORIDE 9 MG/ML
25 INJECTION, SOLUTION INTRAVENOUS PRN
Status: DISCONTINUED | OUTPATIENT
Start: 2024-01-23 | End: 2024-01-23 | Stop reason: HOSPADM

## 2024-01-23 RX ORDER — SODIUM CHLORIDE, SODIUM LACTATE, POTASSIUM CHLORIDE, CALCIUM CHLORIDE 600; 310; 30; 20 MG/100ML; MG/100ML; MG/100ML; MG/100ML
INJECTION, SOLUTION INTRAVENOUS CONTINUOUS
Status: DISCONTINUED | OUTPATIENT
Start: 2024-01-23 | End: 2024-01-23 | Stop reason: HOSPADM

## 2024-01-23 RX ORDER — PROPOFOL 10 MG/ML
INJECTION, EMULSION INTRAVENOUS PRN
Status: DISCONTINUED | OUTPATIENT
Start: 2024-01-23 | End: 2024-01-23 | Stop reason: SDUPTHER

## 2024-01-23 RX ORDER — SODIUM CHLORIDE 0.9 % (FLUSH) 0.9 %
5-40 SYRINGE (ML) INJECTION EVERY 12 HOURS SCHEDULED
Status: DISCONTINUED | OUTPATIENT
Start: 2024-01-23 | End: 2024-01-23 | Stop reason: HOSPADM

## 2024-01-23 RX ORDER — EPHEDRINE SULFATE/0.9% NACL/PF 50 MG/5 ML
SYRINGE (ML) INTRAVENOUS PRN
Status: DISCONTINUED | OUTPATIENT
Start: 2024-01-23 | End: 2024-01-23 | Stop reason: SDUPTHER

## 2024-01-23 RX ADMIN — PROPOFOL 50 MG: 10 INJECTION, EMULSION INTRAVENOUS at 09:15

## 2024-01-23 RX ADMIN — PROPOFOL 100 MCG/KG/MIN: 10 INJECTION, EMULSION INTRAVENOUS at 09:16

## 2024-01-23 RX ADMIN — PROPOFOL 30 MG: 10 INJECTION, EMULSION INTRAVENOUS at 09:13

## 2024-01-23 RX ADMIN — LIDOCAINE HYDROCHLORIDE 60 MG: 20 INJECTION, SOLUTION EPIDURAL; INFILTRATION; INTRACAUDAL; PERINEURAL at 09:13

## 2024-01-23 RX ADMIN — SODIUM CHLORIDE, POTASSIUM CHLORIDE, SODIUM LACTATE AND CALCIUM CHLORIDE: 600; 310; 30; 20 INJECTION, SOLUTION INTRAVENOUS at 08:42

## 2024-01-23 RX ADMIN — Medication 10 MG: at 09:21

## 2024-01-23 ASSESSMENT — PAIN - FUNCTIONAL ASSESSMENT
PAIN_FUNCTIONAL_ASSESSMENT: 0-10

## 2024-01-23 NOTE — ANESTHESIA POSTPROCEDURE EVALUATION
Department of Anesthesiology  Postprocedure Note    Patient: Brent Lamas Jr.  MRN: [id number]  YOB: 1950  Date of evaluation: 1/23/2024    Procedure Summary     Date: 01/23/24 Room / Location: [facility] 03 / [facility] ENDOSCOPY    Anesthesia Start: 0909 Anesthesia Stop: 0932    Procedure: COLORECTAL CANCER SCREENING, NOT HIGH RISK - polypectomy (Bilateral) Diagnosis:       Screen for colon cancer      (Screen for colon cancer [Z12.11])    Surgeons: Flory Thomas MD Responsible Provider: Trav Bowen MD    Anesthesia Type: TIVA ASA Status: 3          Anesthesia Type: TIVA    Wendy Phase I: Wendy Score: 10    Wendy Phase II: Wendy Score: 8    Anesthesia Post Evaluation    Patient location during evaluation: PACU  Patient participation: complete - patient participated  Level of consciousness: awake and alert  Airway patency: patent  Nausea & Vomiting: no nausea and no vomiting  Cardiovascular status: hemodynamically stable  Respiratory status: acceptable, nonlabored ventilation and spontaneous ventilation  Hydration status: euvolemic  Comments: /60   Pulse 62   Temp 98 °F (36.7 °C) (Tympanic)   Resp 15   Ht 1.829 m (6')   Wt 89.8 kg (198 lb)   SpO2 94%   BMI 26.85 kg/m²     Multimodal analgesia pain management approach  Pain management: adequate and satisfactory to patient        No notable events documented.

## 2024-01-23 NOTE — OP NOTE
Operative Report    Patient: Brent Lamas Jr. MRN: [id number]      YOB: 1950  Age: 74 y.o.  Sex: male            Indications:  Screening    Preoperative Evaluation: The patient was evaluated prior to the procedure in the GI lab admission area, the patient ASA was recorded .  Consent was obtained from the patient with the risk of perforation bleeding and aspiration.    Anesthesia: JEFFERSON-per anesthesia    Complications: None; patient tolerated the procedure well.    EBL -insignificant      Procedure: The patient was sedated in the left lateral decubitus position.  Scope was advanced from the rectum to the cecum.  Evaluation was performed to the cecum twice.  The scope was withdrawn to the rectum, retroflexed view was performed.  The rectal exam was normal.  Preparation was adequate Rahway score of 9.    Findings:    1 polyp located in the transverse colon, 5 mm in size, removed via cold snare polypectomy.  Otherwise normal-appearing colon.  Internal hemorrhoids.    Postoperative Diagnosis:   1 polyp.  Internal hemorrhoids.    Recommendations:   Interval of the next colonoscopy will be determined by pending pathology, likely 5 years)    Signed By:  Flory Thomas MD     January 23, 2024

## 2024-01-23 NOTE — H&P
LITHOTRIPSY      VA UNLISTED PROCEDURE CARDIAC SURGERY      ETOH Ablation    TESTICLE REMOVAL Left      Family History   Problem Relation Age of Onset    Cancer Brother         renal cell ca    Glaucoma Other     Breast Cancer Maternal Grandmother     Hypertension Mother     Breast Cancer Mother     Hypertension Father     Hypertension Other      Social History     Tobacco Use    Smoking status: Never    Smokeless tobacco: Never   Substance Use Topics    Alcohol use: No    Drug use: Yes     Types: Prescription     No Known Allergies  Current Outpatient Medications   Medication Instructions    aspirin 81 mg, Oral, DAILY WITH LUNCH    ibuprofen (ADVIL;MOTRIN) 600 mg, Oral, EVERY 6 HOURS PRN    metoprolol succinate (TOPROL XL) 100 mg, Oral, DAILY    Multiple Vitamin (MULTIVITAMIN ADULT PO) Oral, DAILY    simvastatin (ZOCOR) 40 mg, Oral, NIGHTLY    Testosterone (ANDROGEL) 40.5 mg, TransDERmal, DAILY, APPLY 2 DEPRESSIONS TO UPPER ARM IN THE MORNING BEFORE 9 AM     Review of Systems    ROS:    A complete 11 system ROS was performed and was negative aside from the pertinent negative and positives noted above.     PE:   There were no vitals filed for this visit.   General:  The patient appears well-nourished, and is in no acute distress.    Skin:  no rash, ulcers. No Bleeding or signs of infection.  HEENT:  Normocephalic, atraumatic. No sclerae icterus.   Neck:  No pain on palpation or mobilization of the neck.  Respiratory: Respiratory effort is normal. Expansion maintained bilaterally and symmetrically. Normal breath sounds and clear to auscultation bilaterally without wheezes, rales, or rhonchi.    Cardiovascular:  Regular rate and rhythm.     Abdomen:  Soft, non tender to palpation. No distention. Normoactive bowel sounds present.    Extremities: No edema bilaterally. No erythema  Neurologic:  Alert and oriented x3.  No sensory deficits. No asterixis  Psychiatric: Appropriate mood and affect.  Musculoskeletal: Strength

## 2024-01-23 NOTE — DISCHARGE INSTRUCTIONS
Gastrointestinal Colonoscopy/Flexible Sigmoidoscopy - Lower Exam Discharge Instructions  Call Dr. Thomas at [phone] for any problems or questions.  Contact the doctor’s office for follow up appointment as directed  Medication may cause drowsiness for several hours, therefore, do not drive or operate machinery for remainder of the day.  No alcohol today.  Do not make any important decisions such signing legal paperwork.  Ordinarily, you may resume regular diet and activity after exam unless otherwise specified by your physician.  Because of air put into your colon during exam, you may experience some abdominal distension, relieved by the passage of gas, for several hours.  Contact your physician if you have any of the following:  Excessive amount of bleeding - large amount when having a bowel movement.  Occasional specks of blood with bowel movement would not be unusual.  Severe abdominal pain  Fever or Chills  Polyp Removal - follow these additional instructions  Clear liquid diet for the next meal (jell-o, broth, clear drinks)  Soft diet for 24 hours, then resume regular diet   Take Metamucil - 1 tablespoon in juice every morning for 3 days, if needed.  No Aspirin, Advil, Aleve, Nuprin, Ibuprofen, or medications that contain these drugs for 2 weeks.    Any additional instructions:   Interval of the next colonoscopy will be determined by pending pathology, likely 5 years         Instructions given to Brent Lamas Jr. and other family members.

## 2024-01-24 NOTE — ADDENDUM NOTE
Addendum  created 01/23/24 1929 by Jacquelin Diaz APRN - CRNA    Flowsheet accepted, Flowsheet data copied forward, Intraprocedure Flowsheets edited

## 2024-02-04 PROBLEM — Z12.11 SCREEN FOR COLON CANCER: Status: RESOLVED | Noted: 2024-01-05 | Resolved: 2024-02-04

## 2024-02-16 ENCOUNTER — TELEPHONE (OUTPATIENT)
Dept: GASTROENTEROLOGY | Age: 74
End: 2024-02-16

## 2024-05-02 NOTE — PROGRESS NOTES
[facility]  [address]  [place] [address]  [phone]    Patient Name:  Brent Lamas Jr.  YOB: 1950      Office Visit 5/3/2024    CHIEF COMPLAINT:    Chief Complaint   Patient presents with    Sleep Apnea       HISTORY OF PRESENT ILLNESS:         This patient was seen today for follow-up of sleep apnea and restless leg syndrome.  He was diagnosed with a sleep disordered breathing in 2016 and his AHI was 31.6/hour  and the lowest oxygen saturation was 87%.  Subsequently he was started on auto CPAP at 8-15 cm with heated humidity using fullface mask.  He is using and benefiting from his CPAP machine and his average daily use is 5 hours and 9 minutes on the days he has used it.  His AHI is 6.6/hour and his average pressure requirement is 8.8 cm.  Of note that his restless leg  was treated previously with Lyrica but currently is off that.  His last ferritin was 46.  He indicated he has not been able to use the machine over the last 2 months since his son was killed with a tragic accident at his business or machine run over him accidentally.  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  I provided him with emotional support as much as possible  and encouraged him to continue to use the machine as much as possible to improve his sleep quality.  He will continue his follow-up with his cardiologist regarding his hypertrophic obstructive cardiomyopathy.  We will continue his setting at the same level of 8-15 cm.  In the meantime we will renew his mask and supplies order.      The [place] score is 5/24 which has improved from before.            Past Medical History:   Diagnosis Date    Aortic regurgitation 09/29/2016    Moderate AI,LV EF=70.5%,normal LV size,moderate LVH,and mild diastolic dysfx.mild MR

## 2024-05-03 ENCOUNTER — OFFICE VISIT (OUTPATIENT)
Dept: SLEEP MEDICINE | Age: 74
End: 2024-05-03
Payer: MEDICARE

## 2024-05-03 VITALS
RESPIRATION RATE: 14 BRPM | HEIGHT: 73 IN | HEART RATE: 62 BPM | BODY MASS INDEX: 26.9 KG/M2 | DIASTOLIC BLOOD PRESSURE: 78 MMHG | OXYGEN SATURATION: 96 % | WEIGHT: 203 LBS | SYSTOLIC BLOOD PRESSURE: 120 MMHG

## 2024-05-03 DIAGNOSIS — G47.33 OSA (OBSTRUCTIVE SLEEP APNEA): Primary | ICD-10-CM

## 2024-05-03 DIAGNOSIS — G25.81 RESTLESS LEGS SYNDROME (RLS): ICD-10-CM

## 2024-05-03 DIAGNOSIS — G47.10 HYPERSOMNIA: ICD-10-CM

## 2024-05-03 PROCEDURE — 1123F ACP DISCUSS/DSCN MKR DOCD: CPT | Performed by: INTERNAL MEDICINE

## 2024-05-03 PROCEDURE — 99214 OFFICE O/P EST MOD 30 MIN: CPT | Performed by: INTERNAL MEDICINE

## 2024-05-03 PROCEDURE — G8427 DOCREV CUR MEDS BY ELIG CLIN: HCPCS | Performed by: INTERNAL MEDICINE

## 2024-05-03 PROCEDURE — 3078F DIAST BP <80 MM HG: CPT | Performed by: INTERNAL MEDICINE

## 2024-05-03 PROCEDURE — G8417 CALC BMI ABV UP PARAM F/U: HCPCS | Performed by: INTERNAL MEDICINE

## 2024-05-03 PROCEDURE — 1036F TOBACCO NON-USER: CPT | Performed by: INTERNAL MEDICINE

## 2024-05-03 PROCEDURE — 3017F COLORECTAL CA SCREEN DOC REV: CPT | Performed by: INTERNAL MEDICINE

## 2024-05-03 PROCEDURE — 3074F SYST BP LT 130 MM HG: CPT | Performed by: INTERNAL MEDICINE

## 2024-05-03 ASSESSMENT — SLEEP AND FATIGUE QUESTIONNAIRES
HOW LIKELY ARE YOU TO NOD OFF OR FALL ASLEEP WHILE SITTING INACTIVE IN A PUBLIC PLACE: WOULD NEVER DOZE
HOW LIKELY ARE YOU TO NOD OFF OR FALL ASLEEP WHILE SITTING AND READING: SLIGHT CHANCE OF DOZING
HOW LIKELY ARE YOU TO NOD OFF OR FALL ASLEEP WHILE WATCHING TV: SLIGHT CHANCE OF DOZING
HOW LIKELY ARE YOU TO NOD OFF OR FALL ASLEEP WHILE LYING DOWN TO REST IN THE AFTERNOON WHEN CIRCUMSTANCES PERMIT: HIGH CHANCE OF DOZING
ESS TOTAL SCORE: 5
HOW LIKELY ARE YOU TO NOD OFF OR FALL ASLEEP WHILE SITTING AND TALKING TO SOMEONE: WOULD NEVER DOZE
HOW LIKELY ARE YOU TO NOD OFF OR FALL ASLEEP IN A CAR, WHILE STOPPED FOR A FEW MINUTES IN TRAFFIC: WOULD NEVER DOZE
HOW LIKELY ARE YOU TO NOD OFF OR FALL ASLEEP WHEN YOU ARE A PASSENGER IN A CAR FOR AN HOUR WITHOUT A BREAK: WOULD NEVER DOZE
HOW LIKELY ARE YOU TO NOD OFF OR FALL ASLEEP WHILE SITTING QUIETLY AFTER LUNCH WITHOUT ALCOHOL: WOULD NEVER DOZE

## 2024-05-09 ENCOUNTER — TELEPHONE (OUTPATIENT)
Age: 74
End: 2024-05-09

## 2024-05-09 NOTE — TELEPHONE ENCOUNTER
Called pt, informed him that I have not received [employer] forms. Pt stated that [facility] was suppose to send them, he will call [employer] to resend documents

## 2024-05-09 NOTE — TELEPHONE ENCOUNTER
Patient is checking on the status of [employer] medical forms that were sent for Dr Marie to complete. (Forms are to determine life insurance eligibility)

## 2024-06-07 DIAGNOSIS — R79.89 LOW TESTOSTERONE: ICD-10-CM

## 2024-06-11 RX ORDER — TESTOSTERONE 16.2 MG/G
2 GEL TRANSDERMAL DAILY
Qty: 75 G | Refills: 2 | Status: SHIPPED | OUTPATIENT
Start: 2024-06-11 | End: 2025-06-11

## 2024-07-29 ENCOUNTER — OFFICE VISIT (OUTPATIENT)
Dept: INTERNAL MEDICINE CLINIC | Facility: CLINIC | Age: 74
End: 2024-07-29
Payer: MEDICARE

## 2024-07-29 VITALS
HEART RATE: 76 BPM | SYSTOLIC BLOOD PRESSURE: 139 MMHG | DIASTOLIC BLOOD PRESSURE: 65 MMHG | OXYGEN SATURATION: 96 % | WEIGHT: 206 LBS | HEIGHT: 73 IN | BODY MASS INDEX: 27.3 KG/M2 | TEMPERATURE: 97.3 F

## 2024-07-29 DIAGNOSIS — G89.29 CHRONIC PAIN OF RIGHT KNEE: Primary | ICD-10-CM

## 2024-07-29 DIAGNOSIS — M25.561 CHRONIC PAIN OF RIGHT KNEE: Primary | ICD-10-CM

## 2024-07-29 DIAGNOSIS — B07.0 PLANTAR WART OF RIGHT FOOT: ICD-10-CM

## 2024-07-29 PROCEDURE — G8417 CALC BMI ABV UP PARAM F/U: HCPCS | Performed by: INTERNAL MEDICINE

## 2024-07-29 PROCEDURE — 3078F DIAST BP <80 MM HG: CPT | Performed by: INTERNAL MEDICINE

## 2024-07-29 PROCEDURE — 17110 DESTRUCTION B9 LES UP TO 14: CPT | Performed by: INTERNAL MEDICINE

## 2024-07-29 PROCEDURE — G8427 DOCREV CUR MEDS BY ELIG CLIN: HCPCS | Performed by: INTERNAL MEDICINE

## 2024-07-29 PROCEDURE — 1123F ACP DISCUSS/DSCN MKR DOCD: CPT | Performed by: INTERNAL MEDICINE

## 2024-07-29 PROCEDURE — 1036F TOBACCO NON-USER: CPT | Performed by: INTERNAL MEDICINE

## 2024-07-29 PROCEDURE — 3017F COLORECTAL CA SCREEN DOC REV: CPT | Performed by: INTERNAL MEDICINE

## 2024-07-29 PROCEDURE — 3075F SYST BP GE 130 - 139MM HG: CPT | Performed by: INTERNAL MEDICINE

## 2024-07-29 PROCEDURE — 99214 OFFICE O/P EST MOD 30 MIN: CPT | Performed by: INTERNAL MEDICINE

## 2024-07-29 RX ORDER — MELOXICAM 15 MG/1
7.5 TABLET ORAL DAILY
Qty: 30 TABLET | Refills: 0 | Status: SHIPPED | OUTPATIENT
Start: 2024-07-29

## 2024-07-29 ASSESSMENT — PATIENT HEALTH QUESTIONNAIRE - PHQ9
SUM OF ALL RESPONSES TO PHQ QUESTIONS 1-9: 2
2. FEELING DOWN, DEPRESSED OR HOPELESS: SEVERAL DAYS
SUM OF ALL RESPONSES TO PHQ QUESTIONS 1-9: 2
SUM OF ALL RESPONSES TO PHQ QUESTIONS 1-9: 2
1. LITTLE INTEREST OR PLEASURE IN DOING THINGS: SEVERAL DAYS
SUM OF ALL RESPONSES TO PHQ QUESTIONS 1-9: 2
SUM OF ALL RESPONSES TO PHQ9 QUESTIONS 1 & 2: 2

## 2024-07-29 NOTE — PROGRESS NOTES
07/29/2024   Location:[facility] PHYSICIAN SERVICES  [facility] INTERNAL MEDICINE  [place]  Patient #:  [id number]  YOB: 1950        History of Present Illness     Chief Complaint   Patient presents with    Knee Pain     Right knee pain worsening over time. Pt reports it worsens with activity and driving. Does wear a sleeve but it does not help much     Foot Pain     Has painful spot under his right big toe. First noticed it a few weeks ago        Mr. Lamas is a 74 y.o. male  who presents for  The above complaints which were reviewed with the patient in detail.         Last Labs      No Known Allergies  Past Medical History:   Diagnosis Date    Aortic regurgitation 09/29/2016    Moderate AI,LV EF=70.5%,normal LV size,moderate LVH,and mild diastolic dysfx.mild MR    Aortic valve disorders     Calculus of kidney     Cardiac murmur     Coronary artery disease due to lipid rich plaque 05/08/2018    Cysts of eyelids     Disturbance of skin sensation     Encounter for long-term (current) use of other medications     Essential hypertension 03/09/2017    Fatigue     GERD (gastroesophageal reflux disease)     Glaucoma     Headache(784.0)     HOCM (hypertrophic obstructive cardiomyopathy) (HCC)     Ill-defined condition     hereditary neuropathy    Impotence of organic origin     Insomnia, unspecified     MVP (mitral valve prolapse)     asymptomatic- dx 5-6 years ago    Nonrheumatic aortic valve insufficiency 10/05/2016    Nonspecific abnormal finding in stool contents     Other and unspecified hyperlipidemia     Other testicular hypofunction     Palpitations 04/18/2016    Personal history of kidney stones     Plantar fascial fibromatosis     PVC (premature ventricular contraction)     Restless legs syndrome (RLS)     Sleep apnea     cpap    SOB (shortness of breath)     Tension headache     Unspecified disorder of prostate      Social History     Socioeconomic History    Marital status:      Spouse

## 2024-08-21 ENCOUNTER — OFFICE VISIT (OUTPATIENT)
Dept: ORTHOPEDIC SURGERY | Age: 74
End: 2024-08-21

## 2024-08-21 DIAGNOSIS — M25.561 RIGHT KNEE PAIN, UNSPECIFIED CHRONICITY: Primary | ICD-10-CM

## 2024-08-21 RX ORDER — TRIAMCINOLONE ACETONIDE 40 MG/ML
40 INJECTION, SUSPENSION INTRA-ARTICULAR; INTRAMUSCULAR ONCE
Status: COMPLETED | OUTPATIENT
Start: 2024-08-21 | End: 2024-08-21

## 2024-08-21 RX ADMIN — TRIAMCINOLONE ACETONIDE 40 MG: 40 INJECTION, SUSPENSION INTRA-ARTICULAR; INTRAMUSCULAR at 14:51

## 2024-08-21 NOTE — PROGRESS NOTES
Name: Brent Lamas Jr.  YOB: 1950  Gender: male  MRN: [id number]    CC:   Chief Complaint   Patient presents with    Knee Pain     Right Knee pain          DIAGNOSIS:   Encounter Diagnosis   Name Primary?    Right knee pain, unspecified chronicity Yes        HPI:   The pain has been present for months and is becoming worse.  It hurts at night when sleeping.  The pain is located over the knee.  It does hurt to walk and gets worse with increased distances.   The pain does not radiate down the leg.  Numbness and tingling are not noted.   Treatment so far has been nonsteroidals.      Current Outpatient Medications:     meloxicam (MOBIC) 15 MG tablet, Take 0.5 tablets by mouth daily, Disp: 30 tablet, Rfl: 0    Testosterone (ANDROGEL) 20.25 MG/ACT (1.62%) GEL gel, Place 2 actuation onto the skin daily. APPLY 2 DEPRESSIONS TO UPPER ARM IN THE MORNING BEFORE 9 AM Max Daily Amount: 40.5 mg, Disp: 75 g, Rfl: 2    ibuprofen (ADVIL;MOTRIN) 600 MG tablet, Take 1 tablet by mouth every 6 hours as needed for Pain, Disp: 120 tablet, Rfl: 3    metoprolol succinate (TOPROL XL) 100 MG extended release tablet, Take 1 tablet by mouth daily, Disp: 90 tablet, Rfl: 3    simvastatin (ZOCOR) 40 MG tablet, Take 1 tablet by mouth nightly, Disp: 90 tablet, Rfl: 3    Multiple Vitamin (MULTIVITAMIN ADULT PO), Take by mouth daily, Disp: , Rfl:     aspirin 81 MG EC tablet, Take 1 tablet by mouth Daily with lunch, Disp: , Rfl:   No Known Allergies  Past Medical History:   Diagnosis Date    Aortic regurgitation 09/29/2016    Moderate AI,LV EF=70.5%,normal LV size,moderate LVH,and mild diastolic dysfx.mild MR    Aortic valve disorders     Calculus of kidney     Cardiac murmur     Coronary artery disease due to lipid rich plaque 05/08/2018    Cysts of eyelids     Disturbance of skin sensation     Encounter for long-term (current) use of other medications     Essential hypertension 03/09/2017    Fatigue     GERD (gastroesophageal reflux

## 2024-09-11 ENCOUNTER — OFFICE VISIT (OUTPATIENT)
Dept: ORTHOPEDIC SURGERY | Age: 74
End: 2024-09-11
Payer: MEDICARE

## 2024-09-11 DIAGNOSIS — M25.561 RIGHT KNEE PAIN, UNSPECIFIED CHRONICITY: Primary | ICD-10-CM

## 2024-09-11 PROCEDURE — 20611 DRAIN/INJ JOINT/BURSA W/US: CPT | Performed by: ORTHOPAEDIC SURGERY

## 2024-09-11 PROCEDURE — 99213 OFFICE O/P EST LOW 20 MIN: CPT | Performed by: ORTHOPAEDIC SURGERY

## 2024-09-11 PROCEDURE — 3017F COLORECTAL CA SCREEN DOC REV: CPT | Performed by: ORTHOPAEDIC SURGERY

## 2024-09-11 PROCEDURE — 1036F TOBACCO NON-USER: CPT | Performed by: ORTHOPAEDIC SURGERY

## 2024-09-11 PROCEDURE — G8417 CALC BMI ABV UP PARAM F/U: HCPCS | Performed by: ORTHOPAEDIC SURGERY

## 2024-09-11 PROCEDURE — G8428 CUR MEDS NOT DOCUMENT: HCPCS | Performed by: ORTHOPAEDIC SURGERY

## 2024-09-11 PROCEDURE — 1123F ACP DISCUSS/DSCN MKR DOCD: CPT | Performed by: ORTHOPAEDIC SURGERY

## 2024-09-11 RX ORDER — HYALURONATE SODIUM 10 MG/ML
20 SYRINGE (ML) INTRAARTICULAR ONCE
Status: COMPLETED | OUTPATIENT
Start: 2024-09-11 | End: 2024-09-11

## 2024-09-11 RX ADMIN — Medication 20 MG: at 14:47

## 2024-09-18 ENCOUNTER — OFFICE VISIT (OUTPATIENT)
Dept: ORTHOPEDIC SURGERY | Age: 74
End: 2024-09-18
Payer: MEDICARE

## 2024-09-18 DIAGNOSIS — M17.11 OSTEOARTHRITIS OF RIGHT KNEE, UNSPECIFIED OSTEOARTHRITIS TYPE: Primary | ICD-10-CM

## 2024-09-18 PROCEDURE — 20611 DRAIN/INJ JOINT/BURSA W/US: CPT | Performed by: PHYSICIAN ASSISTANT

## 2024-09-18 RX ORDER — HYALURONATE SODIUM 10 MG/ML
20 SYRINGE (ML) INTRAARTICULAR ONCE
Status: COMPLETED | OUTPATIENT
Start: 2024-09-18 | End: 2024-09-18

## 2024-09-18 RX ADMIN — Medication 20 MG: at 13:03

## 2024-09-25 ENCOUNTER — OFFICE VISIT (OUTPATIENT)
Dept: ORTHOPEDIC SURGERY | Age: 74
End: 2024-09-25
Payer: MEDICARE

## 2024-09-25 DIAGNOSIS — M17.11 OSTEOARTHRITIS OF RIGHT KNEE, UNSPECIFIED OSTEOARTHRITIS TYPE: Primary | ICD-10-CM

## 2024-09-25 PROCEDURE — 20611 DRAIN/INJ JOINT/BURSA W/US: CPT | Performed by: PHYSICIAN ASSISTANT

## 2024-09-25 RX ORDER — HYALURONATE SODIUM 10 MG/ML
20 SYRINGE (ML) INTRAARTICULAR ONCE
Status: COMPLETED | OUTPATIENT
Start: 2024-09-25 | End: 2024-09-25

## 2024-09-25 RX ADMIN — Medication 20 MG: at 14:00

## 2024-10-18 ENCOUNTER — OFFICE VISIT (OUTPATIENT)
Dept: INTERNAL MEDICINE CLINIC | Facility: CLINIC | Age: 74
End: 2024-10-18

## 2024-10-18 VITALS
DIASTOLIC BLOOD PRESSURE: 63 MMHG | BODY MASS INDEX: 26.32 KG/M2 | HEIGHT: 73 IN | HEART RATE: 75 BPM | WEIGHT: 198.6 LBS | OXYGEN SATURATION: 95 % | TEMPERATURE: 97.4 F | RESPIRATION RATE: 18 BRPM | SYSTOLIC BLOOD PRESSURE: 149 MMHG

## 2024-10-18 DIAGNOSIS — F32.1 CURRENT MODERATE EPISODE OF MAJOR DEPRESSIVE DISORDER WITHOUT PRIOR EPISODE (HCC): ICD-10-CM

## 2024-10-18 DIAGNOSIS — M54.50 LOW BACK PAIN RADIATING TO RIGHT LOWER EXTREMITY: Primary | ICD-10-CM

## 2024-10-18 DIAGNOSIS — M79.604 LOW BACK PAIN RADIATING TO RIGHT LOWER EXTREMITY: Primary | ICD-10-CM

## 2024-10-18 RX ORDER — METHYLPREDNISOLONE 4 MG
TABLET, DOSE PACK ORAL
Qty: 1 KIT | Refills: 0 | Status: SHIPPED | OUTPATIENT
Start: 2024-10-18 | End: 2024-10-24

## 2024-10-18 RX ORDER — NAPROXEN 500 MG/1
500 TABLET ORAL 2 TIMES DAILY WITH MEALS
Qty: 28 TABLET | Refills: 0 | Status: SHIPPED | OUTPATIENT
Start: 2024-10-18 | End: 2024-11-01

## 2024-10-18 RX ORDER — BACLOFEN 10 MG/1
5 TABLET ORAL NIGHTLY PRN
Qty: 15 TABLET | Refills: 0 | Status: SHIPPED | OUTPATIENT
Start: 2024-10-18

## 2024-10-18 RX ORDER — TRIAMCINOLONE ACETONIDE 40 MG/ML
40 INJECTION, SUSPENSION INTRA-ARTICULAR; INTRAMUSCULAR ONCE
Status: COMPLETED | OUTPATIENT
Start: 2024-10-18 | End: 2024-10-18

## 2024-10-18 RX ORDER — KETOROLAC TROMETHAMINE 30 MG/ML
30 INJECTION, SOLUTION INTRAMUSCULAR; INTRAVENOUS ONCE
Status: COMPLETED | OUTPATIENT
Start: 2024-10-18 | End: 2024-10-18

## 2024-10-18 RX ADMIN — KETOROLAC TROMETHAMINE 30 MG: 30 INJECTION, SOLUTION INTRAMUSCULAR; INTRAVENOUS at 14:54

## 2024-10-18 RX ADMIN — TRIAMCINOLONE ACETONIDE 40 MG: 40 INJECTION, SUSPENSION INTRA-ARTICULAR; INTRAMUSCULAR at 14:55

## 2024-10-18 ASSESSMENT — PATIENT HEALTH QUESTIONNAIRE - PHQ9
SUM OF ALL RESPONSES TO PHQ QUESTIONS 1-9: 10
8. MOVING OR SPEAKING SO SLOWLY THAT OTHER PEOPLE COULD HAVE NOTICED. OR THE OPPOSITE, BEING SO FIGETY OR RESTLESS THAT YOU HAVE BEEN MOVING AROUND A LOT MORE THAN USUAL: NOT AT ALL
1. LITTLE INTEREST OR PLEASURE IN DOING THINGS: NEARLY EVERY DAY
SUM OF ALL RESPONSES TO PHQ QUESTIONS 1-9: 10
4. FEELING TIRED OR HAVING LITTLE ENERGY: SEVERAL DAYS
9. THOUGHTS THAT YOU WOULD BE BETTER OFF DEAD, OR OF HURTING YOURSELF: NOT AT ALL
6. FEELING BAD ABOUT YOURSELF - OR THAT YOU ARE A FAILURE OR HAVE LET YOURSELF OR YOUR FAMILY DOWN: NEARLY EVERY DAY
SUM OF ALL RESPONSES TO PHQ QUESTIONS 1-9: 10
5. POOR APPETITE OR OVEREATING: NOT AT ALL
3. TROUBLE FALLING OR STAYING ASLEEP: NOT AT ALL
SUM OF ALL RESPONSES TO PHQ QUESTIONS 1-9: 10
2. FEELING DOWN, DEPRESSED OR HOPELESS: MORE THAN HALF THE DAYS
SUM OF ALL RESPONSES TO PHQ9 QUESTIONS 1 & 2: 5
7. TROUBLE CONCENTRATING ON THINGS, SUCH AS READING THE NEWSPAPER OR WATCHING TELEVISION: SEVERAL DAYS

## 2024-10-18 ASSESSMENT — ENCOUNTER SYMPTOMS
BACK PAIN: 1
COUGH: 0
SHORTNESS OF BREATH: 0
ABDOMINAL PAIN: 0

## 2024-10-18 NOTE — PROGRESS NOTES
10/18/2024 2:43 PM  Location:[facility] PHYSICIAN SERVICES  [facility] [place]ICINE  [place]  Patient #:  [id number]  YOB: 1950      History of Present Illness     Chief Complaint   Patient presents with    Back Pain     Patient presents in the office today c/o lower back pain radiating to R leg x 1 week.  Has been taking Motrin and using Salonpas patches with no relief.     Depression     Patient would also like to discuss starting an antidepressant since his son passed away in February.        Mr. Lamas is a 74 y.o. male  who presents for back pain in lower back on R side and radiating into R leg. Had been working on mothers house and last week when returned home it began to ache and radiate pain into R thigh.  He was also constipated somewhat. He has R knee arthritis , has been on motrin for this and is having R knee replacement next year. He has had small bms, taking stool softner. Denies abd pain.   He had worsening pain in lower back last night after moving something upstairs and felt a popping sensation in R lower back along with  numbness in the R thigh. Hx of neuropathy bilat. Has had mri of lumbar spine with spinal stenosis noted and herniated disc L4-5.  Stream of urine is less than usual but no incontinence of b/b or saddle numbness.      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 Son left 3 children 21, 18 and 11 yr old. Pt  Has felt a lot different since. Feels sad, down, depressed, not worrying much or having trouble sleeping but feels like he has no motivation to do anything. Denies suicidal/homicidal ideas/thoughts. Trouble focusing on things due to the sadness/emptiness. States wife takes zoloft and it has helped her some.         No Known Allergies     Current Outpatient Medications   Medication Sig Dispense Refill    methylPREDNISolone (MEDROL DOSEPACK) 4 MG tablet Take by mouth. 1 kit 0    baclofen

## 2024-11-21 DIAGNOSIS — M17.11 OSTEOARTHRITIS OF RIGHT KNEE, UNSPECIFIED OSTEOARTHRITIS TYPE: Primary | ICD-10-CM

## 2024-12-05 SDOH — HEALTH STABILITY: PHYSICAL HEALTH: ON AVERAGE, HOW MANY DAYS PER WEEK DO YOU ENGAGE IN MODERATE TO STRENUOUS EXERCISE (LIKE A BRISK WALK)?: 2 DAYS

## 2024-12-05 SDOH — HEALTH STABILITY: PHYSICAL HEALTH: ON AVERAGE, HOW MANY MINUTES DO YOU ENGAGE IN EXERCISE AT THIS LEVEL?: 150+ MIN

## 2024-12-05 ASSESSMENT — LIFESTYLE VARIABLES
HOW OFTEN DO YOU HAVE A DRINK CONTAINING ALCOHOL: 2
HOW MANY STANDARD DRINKS CONTAINING ALCOHOL DO YOU HAVE ON A TYPICAL DAY: 1
HOW OFTEN DO YOU HAVE SIX OR MORE DRINKS ON ONE OCCASION: 1
HOW MANY STANDARD DRINKS CONTAINING ALCOHOL DO YOU HAVE ON A TYPICAL DAY: 1 OR 2
HOW OFTEN DO YOU HAVE A DRINK CONTAINING ALCOHOL: MONTHLY OR LESS

## 2024-12-05 ASSESSMENT — PATIENT HEALTH QUESTIONNAIRE - PHQ9
SUM OF ALL RESPONSES TO PHQ QUESTIONS 1-9: 1
SUM OF ALL RESPONSES TO PHQ QUESTIONS 1-9: 1
1. LITTLE INTEREST OR PLEASURE IN DOING THINGS: NOT AT ALL
SUM OF ALL RESPONSES TO PHQ QUESTIONS 1-9: 1
SUM OF ALL RESPONSES TO PHQ9 QUESTIONS 1 & 2: 1
2. FEELING DOWN, DEPRESSED OR HOPELESS: SEVERAL DAYS
SUM OF ALL RESPONSES TO PHQ QUESTIONS 1-9: 1

## 2024-12-06 ENCOUNTER — OFFICE VISIT (OUTPATIENT)
Dept: INTERNAL MEDICINE CLINIC | Facility: CLINIC | Age: 74
End: 2024-12-06

## 2024-12-06 VITALS
SYSTOLIC BLOOD PRESSURE: 130 MMHG | HEIGHT: 73 IN | HEART RATE: 64 BPM | OXYGEN SATURATION: 96 % | DIASTOLIC BLOOD PRESSURE: 74 MMHG | BODY MASS INDEX: 26.9 KG/M2 | TEMPERATURE: 97.4 F | WEIGHT: 203 LBS

## 2024-12-06 DIAGNOSIS — Z00.00 MEDICARE ANNUAL WELLNESS VISIT, SUBSEQUENT: Primary | ICD-10-CM

## 2024-12-06 DIAGNOSIS — R79.89 LOW TESTOSTERONE: ICD-10-CM

## 2024-12-06 DIAGNOSIS — E55.9 VITAMIN D DEFICIENCY, UNSPECIFIED: ICD-10-CM

## 2024-12-06 DIAGNOSIS — Z12.5 ENCOUNTER FOR SCREENING FOR MALIGNANT NEOPLASM OF PROSTATE: ICD-10-CM

## 2024-12-06 DIAGNOSIS — I10 ESSENTIAL HYPERTENSION: ICD-10-CM

## 2024-12-06 DIAGNOSIS — Z63.4 GRIEF AT LOSS OF CHILD: ICD-10-CM

## 2024-12-06 DIAGNOSIS — G62.9 NEUROPATHY: ICD-10-CM

## 2024-12-06 DIAGNOSIS — Z00.00 MEDICARE ANNUAL WELLNESS VISIT, SUBSEQUENT: ICD-10-CM

## 2024-12-06 DIAGNOSIS — I25.83 CORONARY ARTERY DISEASE DUE TO LIPID RICH PLAQUE: ICD-10-CM

## 2024-12-06 DIAGNOSIS — G47.33 OSA (OBSTRUCTIVE SLEEP APNEA): ICD-10-CM

## 2024-12-06 DIAGNOSIS — F43.21 GRIEF AT LOSS OF CHILD: ICD-10-CM

## 2024-12-06 DIAGNOSIS — I42.1 HOCM (HYPERTROPHIC OBSTRUCTIVE CARDIOMYOPATHY) (HCC): ICD-10-CM

## 2024-12-06 DIAGNOSIS — I25.10 CORONARY ARTERY DISEASE DUE TO LIPID RICH PLAQUE: ICD-10-CM

## 2024-12-06 DIAGNOSIS — E78.5 DYSLIPIDEMIA: ICD-10-CM

## 2024-12-06 DIAGNOSIS — F32.9 REACTIVE DEPRESSION: ICD-10-CM

## 2024-12-06 LAB
25(OH)D3 SERPL-MCNC: 27.6 NG/ML (ref 30–100)
ALBUMIN SERPL-MCNC: 4 G/DL (ref 3.2–4.6)
ALBUMIN/GLOB SERPL: 1.3 (ref 1–1.9)
ALP SERPL-CCNC: 71 U/L (ref 40–129)
ALT SERPL-CCNC: 24 U/L (ref 8–55)
ANION GAP SERPL CALC-SCNC: 9 MMOL/L (ref 7–16)
AST SERPL-CCNC: 22 U/L (ref 15–37)
BASOPHILS # BLD: 0.1 K/UL (ref 0–0.2)
BASOPHILS NFR BLD: 1 % (ref 0–2)
BILIRUB SERPL-MCNC: 0.6 MG/DL (ref 0–1.2)
BUN SERPL-MCNC: 15 MG/DL (ref 8–23)
CALCIUM SERPL-MCNC: 9.6 MG/DL (ref 8.8–10.2)
CHLORIDE SERPL-SCNC: 104 MMOL/L (ref 98–107)
CHOLEST SERPL-MCNC: 213 MG/DL (ref 0–200)
CO2 SERPL-SCNC: 26 MMOL/L (ref 20–29)
CREAT SERPL-MCNC: 1 MG/DL (ref 0.8–1.3)
DIFFERENTIAL METHOD BLD: NORMAL
EOSINOPHIL # BLD: 0.1 K/UL (ref 0–0.8)
EOSINOPHIL NFR BLD: 2 % (ref 0.5–7.8)
ERYTHROCYTE [DISTWIDTH] IN BLOOD BY AUTOMATED COUNT: 14.3 % (ref 11.9–14.6)
GLOBULIN SER CALC-MCNC: 2.9 G/DL (ref 2.3–3.5)
GLUCOSE SERPL-MCNC: 89 MG/DL (ref 70–99)
HCT VFR BLD AUTO: 42.4 % (ref 41.1–50.3)
HDLC SERPL-MCNC: 43 MG/DL (ref 40–60)
HDLC SERPL: 5 (ref 0–5)
HGB BLD-MCNC: 14.1 G/DL (ref 13.6–17.2)
IMM GRANULOCYTES # BLD AUTO: 0 K/UL (ref 0–0.5)
IMM GRANULOCYTES NFR BLD AUTO: 1 % (ref 0–5)
LDLC SERPL CALC-MCNC: ABNORMAL MG/DL (ref 0–100)
LDLC SERPL DIRECT ASSAY-MCNC: 118 MG/DL (ref 0–100)
LYMPHOCYTES # BLD: 1.4 K/UL (ref 0.5–4.6)
LYMPHOCYTES NFR BLD: 29 % (ref 13–44)
MCH RBC QN AUTO: 30.5 PG (ref 26.1–32.9)
MCHC RBC AUTO-ENTMCNC: 33.3 G/DL (ref 31.4–35)
MCV RBC AUTO: 91.6 FL (ref 82–102)
MONOCYTES # BLD: 0.5 K/UL (ref 0.1–1.3)
MONOCYTES NFR BLD: 9 % (ref 4–12)
NEUTS SEG # BLD: 2.8 K/UL (ref 1.7–8.2)
NEUTS SEG NFR BLD: 58 % (ref 43–78)
NRBC # BLD: 0 K/UL (ref 0–0.2)
PLATELET # BLD AUTO: 190 K/UL (ref 150–450)
PMV BLD AUTO: 11.7 FL (ref 9.4–12.3)
POTASSIUM SERPL-SCNC: 4.5 MMOL/L (ref 3.5–5.1)
PROT SERPL-MCNC: 6.9 G/DL (ref 6.3–8.2)
PSA SERPL-MCNC: 0.5 NG/ML (ref 0–4)
RBC # BLD AUTO: 4.63 M/UL (ref 4.23–5.6)
SODIUM SERPL-SCNC: 139 MMOL/L (ref 136–145)
TRIGL SERPL-MCNC: 411 MG/DL (ref 0–150)
TSH W FREE THYROID IF ABNORMAL: 2.45 UIU/ML (ref 0.27–4.2)
VIT B12 SERPL-MCNC: 298 PG/ML (ref 193–986)
VLDLC SERPL CALC-MCNC: 82 MG/DL (ref 6–23)
WBC # BLD AUTO: 4.8 K/UL (ref 4.3–11.1)

## 2024-12-06 RX ORDER — SIMVASTATIN 40 MG
40 TABLET ORAL NIGHTLY
Qty: 90 TABLET | Refills: 3 | Status: SHIPPED | OUTPATIENT
Start: 2024-12-06

## 2024-12-06 RX ORDER — METOPROLOL SUCCINATE 100 MG/1
100 TABLET, EXTENDED RELEASE ORAL DAILY
Qty: 90 TABLET | Refills: 3 | Status: SHIPPED | OUTPATIENT
Start: 2024-12-06

## 2024-12-06 SDOH — ECONOMIC STABILITY: FOOD INSECURITY: WITHIN THE PAST 12 MONTHS, THE FOOD YOU BOUGHT JUST DIDN'T LAST AND YOU DIDN'T HAVE MONEY TO GET MORE.: NEVER TRUE

## 2024-12-06 SDOH — ECONOMIC STABILITY: INCOME INSECURITY: HOW HARD IS IT FOR YOU TO PAY FOR THE VERY BASICS LIKE FOOD, HOUSING, MEDICAL CARE, AND HEATING?: NOT HARD AT ALL

## 2024-12-06 SDOH — SOCIAL STABILITY - SOCIAL INSECURITY: DISSAPEARANCE AND DEATH OF FAMILY MEMBER: Z63.4

## 2024-12-06 SDOH — ECONOMIC STABILITY: FOOD INSECURITY: WITHIN THE PAST 12 MONTHS, YOU WORRIED THAT YOUR FOOD WOULD RUN OUT BEFORE YOU GOT MONEY TO BUY MORE.: NEVER TRUE

## 2024-12-06 ASSESSMENT — PATIENT HEALTH QUESTIONNAIRE - PHQ9
2. FEELING DOWN, DEPRESSED OR HOPELESS: MORE THAN HALF THE DAYS
7. TROUBLE CONCENTRATING ON THINGS, SUCH AS READING THE NEWSPAPER OR WATCHING TELEVISION: SEVERAL DAYS
10. IF YOU CHECKED OFF ANY PROBLEMS, HOW DIFFICULT HAVE THESE PROBLEMS MADE IT FOR YOU TO DO YOUR WORK, TAKE CARE OF THINGS AT HOME, OR GET ALONG WITH OTHER PEOPLE: SOMEWHAT DIFFICULT
6. FEELING BAD ABOUT YOURSELF - OR THAT YOU ARE A FAILURE OR HAVE LET YOURSELF OR YOUR FAMILY DOWN: NOT AT ALL
3. TROUBLE FALLING OR STAYING ASLEEP: NOT AT ALL
1. LITTLE INTEREST OR PLEASURE IN DOING THINGS: MORE THAN HALF THE DAYS
SUM OF ALL RESPONSES TO PHQ QUESTIONS 1-9: 10
4. FEELING TIRED OR HAVING LITTLE ENERGY: NEARLY EVERY DAY
SUM OF ALL RESPONSES TO PHQ QUESTIONS 1-9: 10
8. MOVING OR SPEAKING SO SLOWLY THAT OTHER PEOPLE COULD HAVE NOTICED. OR THE OPPOSITE, BEING SO FIGETY OR RESTLESS THAT YOU HAVE BEEN MOVING AROUND A LOT MORE THAN USUAL: SEVERAL DAYS
SUM OF ALL RESPONSES TO PHQ QUESTIONS 1-9: 9
SUM OF ALL RESPONSES TO PHQ9 QUESTIONS 1 & 2: 4
SUM OF ALL RESPONSES TO PHQ QUESTIONS 1-9: 10
9. THOUGHTS THAT YOU WOULD BE BETTER OFF DEAD, OR OF HURTING YOURSELF: SEVERAL DAYS
5. POOR APPETITE OR OVEREATING: NOT AT ALL

## 2024-12-06 ASSESSMENT — COLUMBIA-SUICIDE SEVERITY RATING SCALE - C-SSRS
1. WITHIN THE PAST MONTH, HAVE YOU WISHED YOU WERE DEAD OR WISHED YOU COULD GO TO SLEEP AND NOT WAKE UP?: NO
6. HAVE YOU EVER DONE ANYTHING, STARTED TO DO ANYTHING, OR PREPARED TO DO ANYTHING TO END YOUR LIFE?: NO
2. HAVE YOU ACTUALLY HAD ANY THOUGHTS OF KILLING YOURSELF?: NO

## 2024-12-06 NOTE — PROGRESS NOTES
Medicare Annual Wellness Visit    Brent Lamas JrWalter is here for Medicare AWV    Assessment & Plan   Medicare annual wellness visit, subsequent  Dyslipidemia  The following orders have not been finalized:  -     simvastatin (ZOCOR) 40 MG tablet  Essential hypertension  The following orders have not been finalized:  -     metoprolol succinate (TOPROL XL) 100 MG extended release tablet  HOCM (hypertrophic obstructive cardiomyopathy) (HCC)  The following orders have not been finalized:  -     metoprolol succinate (TOPROL XL) 100 MG extended release tablet    Recommendations for Preventive Services Due: see orders and patient instructions/AVS.  Recommended screening schedule for the next 5-10 years is provided to the patient in written form: see Patient Instructions/AVS.     No follow-ups on file.     Subjective   The following acute and/or chronic problems were also addressed today:  See separate progress note.      Patient's complete Health Risk Assessment and screening values have been reviewed and are found in Flowsheets. The following problems were reviewed today and where indicated follow up appointments were made and/or referrals ordered.    Positive Risk Factor Screenings with Interventions:      Depression:  PHQ-2 Score: 4  PHQ-9 Total Score: 10  Total Score Interpretation: 10-14 = moderate depression  Interventions:  Patient advised to follow-up in this office for further evaluation and treatment  See AVS for additional education material     Drug Use:   Substance and Sexual Activity   Drug Use Yes    Types: Prescription     Interventions:  See AVS for additional education material        General HRA Questions:  Select all that apply: (!) Stress  Interventions - Stress:  See AVS for additional education material      Inactivity:  On average, how many days per week do you engage in moderate to strenuous exercise (like a brisk walk)?: 2 days (!) Abnormal  On average, how many minutes do you engage in exercise 
1 tablet by mouth daily     Dispense:  90 tablet     Refill:  3       Orders Placed This Encounter   Procedures    Lipid Panel     Standing Status:   Future     Number of Occurrences:   1     Standing Expiration Date:   12/6/2025    PSA Screening     Standing Status:   Future     Number of Occurrences:   1     Standing Expiration Date:   12/6/2025    Testosterone Total Only, Male     Standing Status:   Future     Number of Occurrences:   1     Standing Expiration Date:   12/6/2025    TSH with Reflex     Standing Status:   Future     Number of Occurrences:   1     Standing Expiration Date:   12/6/2025    Vitamin B12     Standing Status:   Future     Number of Occurrences:   1     Standing Expiration Date:   12/6/2025    Vitamin D 25 Hydroxy     Standing Status:   Future     Number of Occurrences:   1     Standing Expiration Date:   12/6/2025    Comprehensive Metabolic Panel     Standing Status:   Future     Number of Occurrences:   1     Standing Expiration Date:   12/6/2025    CBC with Auto Differential     Standing Status:   Future     Number of Occurrences:   1     Standing Expiration Date:   12/6/2025       Spent some time on his grief. Continue to encourage considering grief counseling.   Denies SI/HI.  Will keep close follow up. Encouraged to reach out to me if needed.      Return in about 3 months (around 3/6/2025) for routine follow up of chronic medical issues, and as needed for new or worsening problems.        Tonny Loyd MD

## 2024-12-07 LAB — TESTOST SERPL-MCNC: 250 NG/DL (ref 264–916)

## 2024-12-13 ASSESSMENT — ENCOUNTER SYMPTOMS
SHORTNESS OF BREATH: 0
COUGH: 0
ABDOMINAL PAIN: 0

## 2025-01-13 ENCOUNTER — PREP FOR PROCEDURE (OUTPATIENT)
Dept: ORTHOPEDIC SURGERY | Age: 75
End: 2025-01-13

## 2025-01-13 DIAGNOSIS — Z01.818 PREOP EXAMINATION: Primary | ICD-10-CM

## 2025-01-13 RX ORDER — SODIUM CHLORIDE 0.9 % (FLUSH) 0.9 %
5-40 SYRINGE (ML) INJECTION PRN
Status: CANCELLED | OUTPATIENT
Start: 2025-01-13

## 2025-01-13 RX ORDER — SODIUM CHLORIDE 9 MG/ML
INJECTION, SOLUTION INTRAVENOUS PRN
Status: CANCELLED | OUTPATIENT
Start: 2025-01-13

## 2025-01-13 RX ORDER — SODIUM CHLORIDE 0.9 % (FLUSH) 0.9 %
5-40 SYRINGE (ML) INJECTION EVERY 12 HOURS SCHEDULED
Status: CANCELLED | OUTPATIENT
Start: 2025-01-13

## 2025-01-14 ENCOUNTER — HOSPITAL ENCOUNTER (OUTPATIENT)
Dept: SURGERY | Age: 75
Discharge: HOME | End: 2025-01-17
Payer: MEDICARE

## 2025-01-14 ENCOUNTER — HOSPITAL ENCOUNTER (OUTPATIENT)
Dept: REHABILITATION | Age: 75
Discharge: HOME | End: 2025-01-17
Payer: MEDICARE

## 2025-01-14 VITALS
HEART RATE: 65 BPM | OXYGEN SATURATION: 94 % | WEIGHT: 208.5 LBS | SYSTOLIC BLOOD PRESSURE: 152 MMHG | BODY MASS INDEX: 27.63 KG/M2 | RESPIRATION RATE: 18 BRPM | TEMPERATURE: 97.4 F | DIASTOLIC BLOOD PRESSURE: 82 MMHG | HEIGHT: 73 IN

## 2025-01-14 DIAGNOSIS — Z01.818 PREOP EXAMINATION: ICD-10-CM

## 2025-01-14 LAB
ALBUMIN SERPL-MCNC: 3.5 G/DL (ref 3.2–4.6)
ALBUMIN/GLOB SERPL: 1.2 (ref 1–1.9)
ALP SERPL-CCNC: 72 U/L (ref 40–129)
ALT SERPL-CCNC: 21 U/L (ref 8–55)
ANION GAP SERPL CALC-SCNC: 11 MMOL/L (ref 7–16)
APTT PPP: 26.1 SEC (ref 23.3–37.4)
AST SERPL-CCNC: 20 U/L (ref 15–37)
BASOPHILS # BLD: 0.05 K/UL (ref 0–0.2)
BASOPHILS NFR BLD: 0.9 % (ref 0–2)
BILIRUB SERPL-MCNC: 0.6 MG/DL (ref 0–1.2)
BUN SERPL-MCNC: 17 MG/DL (ref 8–23)
CALCIUM SERPL-MCNC: 9.5 MG/DL (ref 8.8–10.2)
CHLORIDE SERPL-SCNC: 101 MMOL/L (ref 98–107)
CO2 SERPL-SCNC: 22 MMOL/L (ref 20–29)
CREAT SERPL-MCNC: 0.89 MG/DL (ref 0.8–1.3)
DIFFERENTIAL METHOD BLD: ABNORMAL
EKG ATRIAL RATE: 63 BPM
EKG DIAGNOSIS: NORMAL
EKG P AXIS: 60 DEGREES
EKG P-R INTERVAL: 182 MS
EKG Q-T INTERVAL: 430 MS
EKG QRS DURATION: 130 MS
EKG QTC CALCULATION (BAZETT): 440 MS
EKG R AXIS: 9 DEGREES
EKG T AXIS: -13 DEGREES
EKG VENTRICULAR RATE: 63 BPM
EOSINOPHIL # BLD: 0.15 K/UL (ref 0–0.8)
EOSINOPHIL NFR BLD: 2.8 % (ref 0.5–7.8)
ERYTHROCYTE [DISTWIDTH] IN BLOOD BY AUTOMATED COUNT: 13.7 % (ref 11.9–14.6)
EST. AVERAGE GLUCOSE BLD GHB EST-MCNC: 104 MG/DL
GLOBULIN SER CALC-MCNC: 3 G/DL (ref 2.3–3.5)
GLUCOSE SERPL-MCNC: 91 MG/DL (ref 70–99)
HBA1C MFR BLD: 5.2 % (ref 0–5.6)
HCT VFR BLD AUTO: 38.6 % (ref 41.1–50.3)
HGB BLD-MCNC: 13.5 G/DL (ref 13.6–17.2)
IMM GRANULOCYTES # BLD AUTO: 0.04 K/UL (ref 0–0.5)
IMM GRANULOCYTES NFR BLD AUTO: 0.7 % (ref 0–5)
INR PPP: 1
LYMPHOCYTES # BLD: 1.42 K/UL (ref 0.5–4.6)
LYMPHOCYTES NFR BLD: 26.1 % (ref 13–44)
MCH RBC QN AUTO: 30.8 PG (ref 26.1–32.9)
MCHC RBC AUTO-ENTMCNC: 35 G/DL (ref 31.4–35)
MCV RBC AUTO: 88.1 FL (ref 82–102)
MONOCYTES # BLD: 0.53 K/UL (ref 0.1–1.3)
MONOCYTES NFR BLD: 9.7 % (ref 4–12)
MRSA DNA SPEC QL NAA+PROBE: NOT DETECTED
NEUTS SEG # BLD: 3.25 K/UL (ref 1.7–8.2)
NEUTS SEG NFR BLD: 59.8 % (ref 43–78)
NRBC # BLD: 0 K/UL (ref 0–0.2)
PLATELET # BLD AUTO: 180 K/UL (ref 150–450)
PMV BLD AUTO: 11.6 FL (ref 9.4–12.3)
POTASSIUM SERPL-SCNC: 4 MMOL/L (ref 3.5–5.1)
PROT SERPL-MCNC: 6.5 G/DL (ref 6.3–8.2)
PROTHROMBIN TIME: 13.8 SEC (ref 11.3–14.9)
RBC # BLD AUTO: 4.38 M/UL (ref 4.23–5.6)
S AUREUS CPE NOSE QL NAA+PROBE: NOT DETECTED
SODIUM SERPL-SCNC: 134 MMOL/L (ref 136–145)
WBC # BLD AUTO: 5.4 K/UL (ref 4.3–11.1)

## 2025-01-14 PROCEDURE — 93005 ELECTROCARDIOGRAM TRACING: CPT | Performed by: ANESTHESIOLOGY

## 2025-01-14 PROCEDURE — 87641 MR-STAPH DNA AMP PROBE: CPT

## 2025-01-14 PROCEDURE — 94760 N-INVAS EAR/PLS OXIMETRY 1: CPT

## 2025-01-14 PROCEDURE — 83036 HEMOGLOBIN GLYCOSYLATED A1C: CPT

## 2025-01-14 PROCEDURE — 85025 COMPLETE CBC W/AUTO DIFF WBC: CPT

## 2025-01-14 PROCEDURE — 93010 ELECTROCARDIOGRAM REPORT: CPT | Performed by: INTERNAL MEDICINE

## 2025-01-14 PROCEDURE — 97161 PT EVAL LOW COMPLEX 20 MIN: CPT

## 2025-01-14 PROCEDURE — 80053 COMPREHEN METABOLIC PANEL: CPT

## 2025-01-14 PROCEDURE — 85730 THROMBOPLASTIN TIME PARTIAL: CPT

## 2025-01-14 PROCEDURE — 98960 EDU&TRN PT SELF-MGMT NQHP 1: CPT

## 2025-01-14 PROCEDURE — 85610 PROTHROMBIN TIME: CPT

## 2025-01-14 ASSESSMENT — PULMONARY FUNCTION TESTS
FEV1 (LITERS): 2.87
FEV1 (%PREDICTED): 85

## 2025-01-14 ASSESSMENT — PROMIS GLOBAL HEALTH SCALE
IN GENERAL, HOW WOULD YOU RATE YOUR PHYSICAL HEALTH [ON A SCALE OF 1 (POOR) TO 5 (EXCELLENT)]?: GOOD
TO WHAT EXTENT ARE YOU ABLE TO CARRY OUT YOUR EVERYDAY PHYSICAL ACTIVITIES SUCH AS WALKING, CLIMBING STAIRS, CARRYING GROCERIES, OR MOVING A CHAIR [ON A SCALE OF 1 (NOT AT ALL) TO 5 (COMPLETELY)]?: MOSTLY
IN THE PAST 7 DAYS, HOW OFTEN HAVE YOU BEEN BOTHERED BY EMOTIONAL PROBLEMS, SUCH AS FEELING ANXIOUS, DEPRESSED, OR IRRITABLE [ON A SCALE FROM 1 (NEVER) TO 5 (ALWAYS)]?: SOMETIMES
SUM OF RESPONSES TO QUESTIONS 3, 6, 7, & 8: 16
IN GENERAL, WOULD YOU SAY YOUR HEALTH IS...[ON A SCALE OF 1 (POOR) TO 5 (EXCELLENT)]: GOOD
IN GENERAL, PLEASE RATE HOW WELL YOU CARRY OUT YOUR USUAL SOCIAL ACTIVITIES (INCLUDES ACTIVITIES AT HOME, AT WORK, AND IN YOUR COMMUNITY, AND RESPONSIBILITIES AS A PARENT, CHILD, SPOUSE, EMPLOYEE, FRIEND, ETC) [ON A SCALE OF 1 (POOR) TO 5 (EXCELLENT)]?: VERY GOOD
HOW IS THE PROMIS V1.1 BEING ADMINISTERED?: PAPER
IN GENERAL, HOW WOULD YOU RATE YOUR MENTAL HEALTH, INCLUDING YOUR MOOD AND YOUR ABILITY TO THINK [ON A SCALE OF 1 (POOR) TO 5 (EXCELLENT)]?: FAIR
IN THE PAST 7 DAYS, HOW WOULD YOU RATE YOUR FATIGUE ON AVERAGE [ON A SCALE FROM 1 (NONE) TO 5 (VERY SEVERE)]?: MILD
IN GENERAL, HOW WOULD YOU RATE YOUR SATISFACTION WITH YOUR SOCIAL ACTIVITIES AND RELATIONSHIPS [ON A SCALE OF 1 (POOR) TO 5 (EXCELLENT)]?: GOOD
IN GENERAL, WOULD YOU SAY YOUR QUALITY OF LIFE IS...[ON A SCALE OF 1 (POOR) TO 5 (EXCELLENT)]: VERY GOOD
WHO IS THE PERSON COMPLETING THE PROMIS V1.1 SURVEY?: SELF
SUM OF RESPONSES TO QUESTIONS 2, 4, 5, & 10: 12
IN THE PAST 7 DAYS, HOW WOULD YOU RATE YOUR PAIN ON AVERAGE [ON A SCALE FROM 0 (NO PAIN) TO 10 (WORST IMAGINABLE PAIN)]?: 5

## 2025-01-14 ASSESSMENT — KOOS JR
TWISING OR PIVOTING ON KNEE: SEVERE
BENDING TO THE FLOOR TO PICK UP OBJECT: MODERATE
GOING UP OR DOWN STAIRS: MODERATE
HOW SEVERE IS YOUR KNEE STIFFNESS AFTER FIRST WAKING IN MORNING: MODERATE
STANDING UPRIGHT: MILD
STRAIGHTENING KNEE FULLY: MODERATE
KOOS JR TOTAL INTERVAL SCORE: 54.84
RISING FROM SITTING: MILD

## 2025-01-14 ASSESSMENT — PAIN DESCRIPTION - DESCRIPTORS
DESCRIPTORS: ACHING;SHARP;STABBING
DESCRIPTORS: ACHING;DULL;THROBBING

## 2025-01-14 ASSESSMENT — PAIN DESCRIPTION - LOCATION
LOCATION: KNEE
LOCATION: KNEE

## 2025-01-14 ASSESSMENT — PAIN DESCRIPTION - ORIENTATION
ORIENTATION: RIGHT
ORIENTATION: RIGHT;ANTERIOR;INNER;OUTER

## 2025-01-14 ASSESSMENT — PAIN SCALES - GENERAL
PAINLEVEL_OUTOF10: 8
PAINLEVEL_OUTOF10: 5

## 2025-01-14 NOTE — PERIOP NOTE
CBC, CMP, PT/PTT, A1C; results are within anesthesia guidelines, no follow-up required. Labs automatically routed to ordering provider via Epic documentation.

## 2025-01-14 NOTE — PERIOP NOTE
PLEASE CONTINUE TAKING ALL PRESCRIPTION MEDICATIONS UP TO THE DAY OF SURGERY UNLESS OTHERWISE DIRECTED BELOW. You may take Tylenol, allergy, and/or indigestion medications.     TAKE ONLY THESE MEDICATIONS ON THE DAY OF SURGERY ON 01/30/25     Metoprolol, Sertraline            DISCONTINUE all vitamins, herbals, and supplements 3 weeks prior to surgery. DISCONTINUE Non-Steroidal Anti-Inflammatory (NSAIDS) such as Advil, Ibuprofen, Motrin, Naproxen, and Aleve 5 days prior to surgery unless instructed to hold longer by surgeon.     Home Medications to Hold- please continue all other medications except these.      Ibuprofen=hold for 5 days unless instructed to hold longer by surgeon         Comments      On the day before surgery (01/29/25) please take 2 Tylenol in the morning and then again before bed. You may use either regular or extra strength.      Bring: Photo ID, Insurance card, Maria Alejandra-hex soap, Incentive Spirometer, C-PAP       Please do not bring home medications with you on the day of surgery unless otherwise directed by your nurse.  If you are instructed to bring home medications, please give them to your nurse as they will be administered by the nursing staff.    If you have any questions, please call [facility] [phone].    A copy of this note was provided to the patient for reference.

## 2025-01-14 NOTE — PERIOP NOTE
-Do not eat anything after midnight the night before surgery    -The anesthesia department would like for you to drink 32 ounces of non-caffeinated clear liquids 2 hours prior to arrival to avoid dehydration     -May have: Apple or Cranberry Juice, Gatorade, or Water.    -You can have any combination of these clear liquids as long as it equals 32 ounces    -Do not place anything in your mouth during the 2 hours before you arrive to the hospital. This would include: Gum, Candy, Mints, Ice Chips, ect..

## 2025-01-14 NOTE — PROGRESS NOTES
Wheelchair Only Dressing:  [x] Independent  Requires Assistance from Someone for:  [] Sock/Shoes  [] Pants  [] Everything   Bathing/Showering:   [x] Independent  [] Requires Assistance from Someone  [] Sponge Bath Only Household Activities:  [x] Routine house and yard work  [] Light Housework Only  [] None     Objective:   PAIN:   Pre-Treatment Pain  Pain Assessment: 0-10  Pain Level: 8 (at its worst)  Pain Location: Knee  Pain Orientation: Right, Anterior, Inner, Outer  Pain Descriptors: Aching, Dull, Throbbing    Post Treatment: 2    Outcome Measure:   HOOS-JR:          No data to display                 KOOS-JR:  KOOSJr. Knee Survey Score: 13  KOOS, JR. KNEE SURVEY How severe is your knee stiffness after first wakening in the morning? Twisting/pivoting on your knee Straightening knee fully Going up or down stairs Standing upright Rising from sitting Bending to floor/ an object Jr JESSIE. Knee Survey Score   1/14/2025  12:07 PM 2 3 2 2 1 1 2 13        LOWER EXTREMITY GROSS EVALUATION:  RIGHT LE   Within Functional Limits   Abnormal   Comments   Strength [] [] Strength RLE  Strength RLE: Exception  R Hip Flexion: 4/5  R Knee Extension: 4/5  R Ankle Dorsiflexion: 4/5  R Ankle Plantar flexion: 4/5   Range of Motion [] [] AROM RLE (degrees)  RLE AROM: Exceptions  R Knee Flexion (0-145): 104  R Knee Extension (0): 10      LEFT LE Within Functional Limits   Abnormal   Comments   Strength [x] []  4/5   Range of Motion [x] []       UPPER EXTREMITY GROSS EVALUATION:     Within Functional Limits   Abnormal   Comments   Strength [x] []    Range of Motion [x] []      SENSATION  Sensation [x]       COGNITION/  PERCEPTION: Intact Impaired (Comments):   Orientation [x]     Vision [x]     Hearing [x]     Cognition  [x]       TRANSFERS: I Mod I S SBA CGA Min Mod Max Total  NT x2 Comments:   Sit to Stand [x] [] [] [] [] [] [] [] [] [] []    Stand to Sit [x] [] [] [] [] [] [] [] [] [] []    Stand pivot [x] [] [] [] [] [] []

## 2025-01-14 NOTE — PROGRESS NOTES
01/14/25 1130   Treatment   Treatment Type Bedside spirometry   Breath Sounds   Breath Sounds Bilateral Clear   Oxygen Therapy/Pulse Ox   O2 Therapy Room air   Pulse 65   SpO2 94 %   Pulse Oximeter Device Mode Intermittent   $Pulse Oximeter $Spot check (single)   Bedside Spirometry   FEV-1/Actual (Liters) 2.87 Liters   FEV-1/Predicted (Liters) 85 Liters     Initial respiratory Assessment completed with pt. Pt was interviewed and evaluated in Joint camp prior to surgery.  Patient ID:  Brent Lamas Jr.  [id number]  75 y.o.  1950  Surgeon: Dr. Diana  Date of Surgery: [facility]  Procedure: Total Right Knee Arthroplasty  Primary Care Physician: Tonny Loyd [phone]  Specialists:    Pt taught proper COUGH technique  IS REVIEWED WITH PT AS WELL AS BENEFITS OF USING IS IN SEDENTARY PTS.  DIAPHRAGMATIC BREATHING EXERCISE INSTRUCTIONS GIVEN    History of smoking:   DENIES                 Quit date:         Secondhand smoke:FATHER    Past procedures with Oxygen desaturation or delayed awakening:DENIES     Respiratory history:DENIES SOB                               DINESH CPAP                                  Respiratory meds:  DENIES    FAMILY PRESENT:             NO     PAST SLEEP STUDY:        YES                    HX OF DINESH:                        YES                   DENIES  DINESH assessment:     DANGERS OF UNTREATED DINESH EXPLAINED TO PT.                                          SLEEPS ON SIDE       &      BACK         &       STOMACH  PHYSICAL EXAM   Body mass index is 27.89 kg/m².   Vitals:    01/14/25 1130   BP:    Pulse: (P) 65   Resp:    Temp:    SpO2: (P) 94%     Neck circumference:  42.5    cm    Loud snoring:                                                 YES            Witnessed apnea or wakening gasping or choking:            APNEA  Awakens with headaches:                                               DENIES  Morning or daytime tiredness/ sleepiness:

## 2025-01-14 NOTE — PERIOP NOTE
Patient: Brent Lamas Jr.  : 1950      The following records have been requested from [facility]:       Attn Medical Records:    Please send EKG (24) and  ECHO (24) via fax to [phone] for patient's upcoming surgery and for anesthesia reference.     Thank you!

## 2025-01-14 NOTE — PERIOP NOTE
Patient verified name and .    Order for consent was found in EHR and does match case posting; patient verified.     Type 3 surgery, joint camp assessment complete.    Labs per surgeon: CBC, CMP, A1C, PT/PTT ; results pending.  Labs per anesthesia protocol: no additional labs needed.  EKG: Completed today and within anesthesia protocol.     Upcoming [facility] appointment on 25 @ 11:15 am. Charge nurse to follow up.     Wt on records (EKG & ECHO) from [facility]. Obtained medical release form and scanned under media tab if needed. Charge nurse to follow up.     PCP note (24), [facility] note (24), Sleep Center note (24), [facility] note (24), EKG (24), and Heart cath (20) available in EHR for reference.     MRSA/MSSA swab collected per policy. MD to consult pharmacy to dose Vanc if appropriate.     Hospital approved surgical skin cleanser and instructions to return bottle on DOS given per hospital policy.    Patient provided with handouts including Guide to Surgery, Pain Management, Preventing Surgical Site Infections, and [place] [facility] Brochure.    Patient answered medical/surgical history questions at their best of ability. All prior to admission medications documented in Epic. Original medication prescription bottle was not visualized during patient appointment.     Patient instructed to hold all vitamins 3 weeks prior to surgery and NSAIDS 5 days prior to surgery.     Patient teach back successful and patient demonstrates knowledge of instruction.

## 2025-01-15 ENCOUNTER — TELEPHONE (OUTPATIENT)
Dept: SURGERY | Age: 75
End: 2025-01-15

## 2025-01-15 NOTE — TELEPHONE ENCOUNTER
Patient: Brent Lamas Jr.  : 1950        The following records have been requested from [facility]:         Attn Medical Records:     Please send EKG (24) and  ECHO (24) via fax to [phone] for patient's upcoming surgery and for anesthesia reference.      Thank you!

## 2025-01-21 DIAGNOSIS — F32.1 CURRENT MODERATE EPISODE OF MAJOR DEPRESSIVE DISORDER WITHOUT PRIOR EPISODE (HCC): ICD-10-CM

## 2025-01-22 NOTE — PROGRESS NOTES
Records received from [facility], scanned into Media if needed for reference.

## 2025-01-24 ENCOUNTER — OFFICE VISIT (OUTPATIENT)
Age: 75
End: 2025-01-24

## 2025-01-24 VITALS
BODY MASS INDEX: 28.31 KG/M2 | HEIGHT: 72 IN | WEIGHT: 209 LBS | SYSTOLIC BLOOD PRESSURE: 132 MMHG | HEART RATE: 71 BPM | DIASTOLIC BLOOD PRESSURE: 78 MMHG

## 2025-01-24 DIAGNOSIS — I25.10 CORONARY ARTERY DISEASE DUE TO LIPID RICH PLAQUE: Primary | ICD-10-CM

## 2025-01-24 DIAGNOSIS — I25.83 CORONARY ARTERY DISEASE DUE TO LIPID RICH PLAQUE: Primary | ICD-10-CM

## 2025-01-24 DIAGNOSIS — Z01.818 PRE-OP EXAM: ICD-10-CM

## 2025-01-24 DIAGNOSIS — I10 ESSENTIAL HYPERTENSION: ICD-10-CM

## 2025-01-24 DIAGNOSIS — I42.1 HOCM (HYPERTROPHIC OBSTRUCTIVE CARDIOMYOPATHY) (HCC): ICD-10-CM

## 2025-01-24 ASSESSMENT — ENCOUNTER SYMPTOMS
DIARRHEA: 0
HOARSE VOICE: 0
SHORTNESS OF BREATH: 0
BOWEL INCONTINENCE: 0
COLOR CHANGE: 0
WHEEZING: 0
ORTHOPNEA: 0
BLURRED VISION: 0
SPUTUM PRODUCTION: 0
HEMATOCHEZIA: 0
HEMATEMESIS: 0
ABDOMINAL PAIN: 0
CHEST TIGHTNESS: 0

## 2025-01-24 NOTE — PROGRESS NOTES
[facility] CARDIOLOGY  [address] [place] [address], [address]  [place] [address]  PHONE: [phone]        25        NAME:  Brent Lamas Jr.  : 1950  MRN: [id number]       SUBJECTIVE:   Brent Lamas Jr. is a 75 y.o. male seen for a follow up visit regarding the following: The patient has HOCM with an alcohol septal ablation at [facility] in the past,Prior cardiac cath reported nonobstructive CAD.In addition,he has primary hypertension.He is experiencing right knee pain and is scheduled for knee replacement by Dr [place] next week.He reports for annual exam and pre op evaluation before elective surgery.He reports no chest pain,dyspnea,syncope,PND,orthopnea,palpitations,or LEACH.    Chief Complaint   Patient presents with    Coronary Artery Disease    Cardiac Clearance     Knee surgery       HPI:    Coronary Artery Disease  Presents for follow-up visit. Pertinent negatives include no chest pain, chest pressure, chest tightness, dizziness, leg swelling, muscle weakness, palpitations, shortness of breath or weight gain. The symptoms have been stable.       Past Medical History, Past Surgical History, Family history, Social History, and Medications were all reviewed with the patient today and updated as necessary.         Current Outpatient Medications:     sertraline (ZOLOFT) 50 MG tablet, Take 1 tablet by mouth daily, Disp: 90 tablet, Rfl: 3    simvastatin (ZOCOR) 40 MG tablet, Take 1 tablet by mouth nightly, Disp: 90 tablet, Rfl: 3    metoprolol succinate (TOPROL XL) 100 MG extended release tablet, Take 1 tablet by mouth daily, Disp: 90 tablet, Rfl: 3    Testosterone (ANDROGEL) 20.25 MG/ACT (1.62%) GEL gel, Place 2 actuation onto the skin daily. APPLY 2 DEPRESSIONS TO UPPER ARM IN THE MORNING BEFORE 9 AM Max Daily Amount: 40.5 mg, Disp: 75 g, Rfl: 2    ibuprofen (ADVIL;MOTRIN) 600 MG tablet, Take 1 tablet by mouth every 6 hours as needed for Pain, Disp: 120 tablet, Rfl: 3    Multiple Vitamin (MULTIVITAMIN  Price (Use Numbers Only, No Special Characters Or $): 156

## 2025-01-27 ENCOUNTER — TELEPHONE (OUTPATIENT)
Dept: ORTHOPEDIC SURGERY | Age: 75
End: 2025-01-27

## 2025-01-27 ENCOUNTER — OFFICE VISIT (OUTPATIENT)
Dept: ORTHOPEDIC SURGERY | Age: 75
End: 2025-01-27

## 2025-01-27 DIAGNOSIS — M17.11 OSTEOARTHRITIS OF RIGHT KNEE, UNSPECIFIED OSTEOARTHRITIS TYPE: Primary | ICD-10-CM

## 2025-01-27 PROCEDURE — PREOPEXAM PRE-OP EXAM: Performed by: PHYSICIAN ASSISTANT

## 2025-01-27 RX ORDER — ONDANSETRON 4 MG/1
4 TABLET, FILM COATED ORAL EVERY 8 HOURS PRN
Qty: 20 TABLET | Refills: 0 | Status: SHIPPED | OUTPATIENT
Start: 2025-01-27

## 2025-01-27 RX ORDER — METHOCARBAMOL 750 MG/1
750 TABLET, FILM COATED ORAL 4 TIMES DAILY PRN
Qty: 30 TABLET | Refills: 0 | Status: SHIPPED | OUTPATIENT
Start: 2025-01-27 | End: 2025-02-06

## 2025-01-27 RX ORDER — OXYCODONE HYDROCHLORIDE 5 MG/1
5-10 TABLET ORAL EVERY 4 HOURS PRN
Qty: 60 TABLET | Refills: 0 | Status: SHIPPED | OUTPATIENT
Start: 2025-01-27 | End: 2025-02-01

## 2025-01-27 NOTE — TELEPHONE ENCOUNTER
Spoke with Estefany at [facility]. She was not aware of any reason that the meds cannot be filled. The meds are currently in the process of being filled so I can disregard this message.

## 2025-01-27 NOTE — PROGRESS NOTES
Name: Brent Lamas Jr.  YOB: 1950  Gender: male  MRN: [id number]      Current Outpatient Medications:     sertraline (ZOLOFT) 50 MG tablet, Take 1 tablet by mouth daily, Disp: 90 tablet, Rfl: 3    simvastatin (ZOCOR) 40 MG tablet, Take 1 tablet by mouth nightly, Disp: 90 tablet, Rfl: 3    metoprolol succinate (TOPROL XL) 100 MG extended release tablet, Take 1 tablet by mouth daily, Disp: 90 tablet, Rfl: 3    baclofen (LIORESAL) 10 MG tablet, Take 0.5 tablets by mouth nightly as needed (muscle spasms), Disp: 15 tablet, Rfl: 0    naproxen (NAPROSYN) 500 MG tablet, Take 1 tablet by mouth 2 times daily (with meals) for 14 days, Disp: 28 tablet, Rfl: 0    Testosterone (ANDROGEL) 20.25 MG/ACT (1.62%) GEL gel, Place 2 actuation onto the skin daily. APPLY 2 DEPRESSIONS TO UPPER ARM IN THE MORNING BEFORE 9 AM Max Daily Amount: 40.5 mg, Disp: 75 g, Rfl: 2    ibuprofen (ADVIL;MOTRIN) 600 MG tablet, Take 1 tablet by mouth every 6 hours as needed for Pain, Disp: 120 tablet, Rfl: 3    Multiple Vitamin (MULTIVITAMIN ADULT PO), Take 1 tablet by mouth daily, Disp: , Rfl:     aspirin 81 MG EC tablet, Take 1 tablet by mouth at bedtime, Disp: , Rfl:   No Known Allergies    Pre-op  exam Right TKA  CC:  right knee pain    History:  Patient returns today for pre-op exam prior to Right TKA.  Postop scripts have been e-scribed to patient's pharmacy.  See formal H&P in Epic chart.

## 2025-01-30 ENCOUNTER — ANESTHESIA (OUTPATIENT)
Dept: SURGERY | Age: 75
End: 2025-01-30
Payer: MEDICARE

## 2025-01-30 ENCOUNTER — ANESTHESIA EVENT (OUTPATIENT)
Dept: SURGERY | Age: 75
End: 2025-01-30
Payer: MEDICARE

## 2025-01-30 ENCOUNTER — HOSPITAL ENCOUNTER (OUTPATIENT)
Age: 75
Discharge: HOME | End: 2025-01-30
Attending: ORTHOPAEDIC SURGERY | Admitting: ORTHOPAEDIC SURGERY
Payer: MEDICARE

## 2025-01-30 VITALS
OXYGEN SATURATION: 94 % | TEMPERATURE: 97.3 F | WEIGHT: 211.9 LBS | HEART RATE: 59 BPM | HEIGHT: 72 IN | SYSTOLIC BLOOD PRESSURE: 130 MMHG | DIASTOLIC BLOOD PRESSURE: 67 MMHG | BODY MASS INDEX: 28.7 KG/M2 | RESPIRATION RATE: 16 BRPM

## 2025-01-30 DIAGNOSIS — R79.89 LOW TESTOSTERONE: ICD-10-CM

## 2025-01-30 PROBLEM — M17.11 OSTEOARTHRITIS OF RIGHT KNEE, UNSPECIFIED OSTEOARTHRITIS TYPE: Status: ACTIVE | Noted: 2025-01-30

## 2025-01-30 PROCEDURE — 2720000010 HC SURG SUPPLY STERILE: Performed by: ORTHOPAEDIC SURGERY

## 2025-01-30 PROCEDURE — 2580000003 HC RX 258: Performed by: ORTHOPAEDIC SURGERY

## 2025-01-30 PROCEDURE — 7100000001 HC PACU RECOVERY - ADDTL 15 MIN: Performed by: ORTHOPAEDIC SURGERY

## 2025-01-30 PROCEDURE — 7100000000 HC PACU RECOVERY - FIRST 15 MIN: Performed by: ORTHOPAEDIC SURGERY

## 2025-01-30 PROCEDURE — 3700000001 HC ADD 15 MINUTES (ANESTHESIA): Performed by: ORTHOPAEDIC SURGERY

## 2025-01-30 PROCEDURE — 2580000003 HC RX 258: Performed by: NURSE ANESTHETIST, CERTIFIED REGISTERED

## 2025-01-30 PROCEDURE — 3600000015 HC SURGERY LEVEL 5 ADDTL 15MIN: Performed by: ORTHOPAEDIC SURGERY

## 2025-01-30 PROCEDURE — C1776 JOINT DEVICE (IMPLANTABLE): HCPCS | Performed by: ORTHOPAEDIC SURGERY

## 2025-01-30 PROCEDURE — 2500000003 HC RX 250 WO HCPCS: Performed by: ORTHOPAEDIC SURGERY

## 2025-01-30 PROCEDURE — 6360000002 HC RX W HCPCS: Performed by: NURSE ANESTHETIST, CERTIFIED REGISTERED

## 2025-01-30 PROCEDURE — 64447 NJX AA&/STRD FEMORAL NRV IMG: CPT | Performed by: ANESTHESIOLOGY

## 2025-01-30 PROCEDURE — 3700000000 HC ANESTHESIA ATTENDED CARE: Performed by: ORTHOPAEDIC SURGERY

## 2025-01-30 PROCEDURE — 2709999900 HC NON-CHARGEABLE SUPPLY: Performed by: ORTHOPAEDIC SURGERY

## 2025-01-30 PROCEDURE — 6370000000 HC RX 637 (ALT 250 FOR IP): Performed by: ANESTHESIOLOGY

## 2025-01-30 PROCEDURE — 97161 PT EVAL LOW COMPLEX 20 MIN: CPT

## 2025-01-30 PROCEDURE — C1713 ANCHOR/SCREW BN/BN,TIS/BN: HCPCS | Performed by: ORTHOPAEDIC SURGERY

## 2025-01-30 PROCEDURE — 6370000000 HC RX 637 (ALT 250 FOR IP): Performed by: ORTHOPAEDIC SURGERY

## 2025-01-30 PROCEDURE — 97535 SELF CARE MNGMENT TRAINING: CPT

## 2025-01-30 PROCEDURE — 97530 THERAPEUTIC ACTIVITIES: CPT

## 2025-01-30 PROCEDURE — 2580000003 HC RX 258: Performed by: ANESTHESIOLOGY

## 2025-01-30 PROCEDURE — 6370000000 HC RX 637 (ALT 250 FOR IP): Performed by: PHYSICIAN ASSISTANT

## 2025-01-30 PROCEDURE — 6360000002 HC RX W HCPCS: Performed by: ANESTHESIOLOGY

## 2025-01-30 PROCEDURE — 3600000005 HC SURGERY LEVEL 5 BASE: Performed by: ORTHOPAEDIC SURGERY

## 2025-01-30 PROCEDURE — 2500000003 HC RX 250 WO HCPCS: Performed by: NURSE ANESTHETIST, CERTIFIED REGISTERED

## 2025-01-30 PROCEDURE — 6360000002 HC RX W HCPCS: Performed by: ORTHOPAEDIC SURGERY

## 2025-01-30 PROCEDURE — 97165 OT EVAL LOW COMPLEX 30 MIN: CPT

## 2025-01-30 DEVICE — COMPONENT TOT KNEE CAPPED PRIMARY K2STRYKER] STRYKER CORP]: Type: IMPLANTABLE DEVICE | Status: FUNCTIONAL

## 2025-01-30 DEVICE — TIBIAL COMPONENT
Type: IMPLANTABLE DEVICE | Site: KNEE | Status: FUNCTIONAL
Brand: TRIATHLON

## 2025-01-30 DEVICE — CRUCIATE RETAINING FEMORAL
Type: IMPLANTABLE DEVICE | Site: KNEE | Status: FUNCTIONAL
Brand: TRIATHLON

## 2025-01-30 RX ORDER — SODIUM CHLORIDE, SODIUM LACTATE, POTASSIUM CHLORIDE, CALCIUM CHLORIDE 600; 310; 30; 20 MG/100ML; MG/100ML; MG/100ML; MG/100ML
INJECTION, SOLUTION INTRAVENOUS CONTINUOUS
Status: DISCONTINUED | OUTPATIENT
Start: 2025-01-30 | End: 2025-01-30 | Stop reason: HOSPADM

## 2025-01-30 RX ORDER — METOPROLOL SUCCINATE 100 MG/1
100 TABLET, EXTENDED RELEASE ORAL DAILY
Status: DISCONTINUED | OUTPATIENT
Start: 2025-01-31 | End: 2025-01-30 | Stop reason: HOSPADM

## 2025-01-30 RX ORDER — LIDOCAINE HYDROCHLORIDE 20 MG/ML
INJECTION, SOLUTION EPIDURAL; INFILTRATION; INTRACAUDAL; PERINEURAL
Status: DISCONTINUED | OUTPATIENT
Start: 2025-01-30 | End: 2025-01-30 | Stop reason: SDUPTHER

## 2025-01-30 RX ORDER — CEFAZOLIN 1 G/1
INJECTION, POWDER, FOR SOLUTION INTRAVENOUS
Status: DISCONTINUED | OUTPATIENT
Start: 2025-01-30 | End: 2025-01-30 | Stop reason: SDUPTHER

## 2025-01-30 RX ORDER — SODIUM CHLORIDE 9 MG/ML
INJECTION, SOLUTION INTRAVENOUS PRN
Status: DISCONTINUED | OUTPATIENT
Start: 2025-01-30 | End: 2025-01-30 | Stop reason: HOSPADM

## 2025-01-30 RX ORDER — SODIUM CHLORIDE 0.9 % (FLUSH) 0.9 %
5-40 SYRINGE (ML) INJECTION PRN
Status: DISCONTINUED | OUTPATIENT
Start: 2025-01-30 | End: 2025-01-30 | Stop reason: HOSPADM

## 2025-01-30 RX ORDER — ASPIRIN 81 MG/1
81 TABLET ORAL 2 TIMES DAILY
Qty: 70 TABLET | Refills: 0
Start: 2025-01-30 | End: 2025-03-06

## 2025-01-30 RX ORDER — SODIUM CHLORIDE 0.9 % (FLUSH) 0.9 %
5-40 SYRINGE (ML) INJECTION EVERY 12 HOURS SCHEDULED
Status: DISCONTINUED | OUTPATIENT
Start: 2025-01-30 | End: 2025-01-30 | Stop reason: HOSPADM

## 2025-01-30 RX ORDER — OXYCODONE HYDROCHLORIDE 5 MG/1
10 TABLET ORAL EVERY 4 HOURS PRN
Status: DISCONTINUED | OUTPATIENT
Start: 2025-01-30 | End: 2025-01-30 | Stop reason: HOSPADM

## 2025-01-30 RX ORDER — POLYETHYLENE GLYCOL 3350 17 G/17G
17 POWDER, FOR SOLUTION ORAL DAILY PRN
Status: DISCONTINUED | OUTPATIENT
Start: 2025-01-30 | End: 2025-01-30 | Stop reason: HOSPADM

## 2025-01-30 RX ORDER — ACETAMINOPHEN 500 MG
1000 TABLET ORAL ONCE
Status: COMPLETED | OUTPATIENT
Start: 2025-01-30 | End: 2025-01-30

## 2025-01-30 RX ORDER — PROPOFOL 10 MG/ML
INJECTION, EMULSION INTRAVENOUS
Status: DISCONTINUED | OUTPATIENT
Start: 2025-01-30 | End: 2025-01-30 | Stop reason: SDUPTHER

## 2025-01-30 RX ORDER — ASPIRIN 81 MG/1
81 TABLET ORAL 2 TIMES DAILY
Status: DISCONTINUED | OUTPATIENT
Start: 2025-01-30 | End: 2025-01-30 | Stop reason: HOSPADM

## 2025-01-30 RX ORDER — OXYCODONE HYDROCHLORIDE 5 MG/1
5 TABLET ORAL
Status: DISCONTINUED | OUTPATIENT
Start: 2025-01-30 | End: 2025-01-30 | Stop reason: HOSPADM

## 2025-01-30 RX ORDER — TESTOSTERONE 1.62 MG/G
2 GEL TRANSDERMAL DAILY
Qty: 75 G | Refills: 2
Start: 2025-01-30 | End: 2026-01-30

## 2025-01-30 RX ORDER — PROCHLORPERAZINE EDISYLATE 5 MG/ML
5 INJECTION INTRAMUSCULAR; INTRAVENOUS
Status: DISCONTINUED | OUTPATIENT
Start: 2025-01-30 | End: 2025-01-30 | Stop reason: HOSPADM

## 2025-01-30 RX ORDER — SODIUM CHLORIDE 9 MG/ML
INJECTION, SOLUTION INTRAVENOUS CONTINUOUS
Status: DISCONTINUED | OUTPATIENT
Start: 2025-01-30 | End: 2025-01-30 | Stop reason: HOSPADM

## 2025-01-30 RX ORDER — METHOCARBAMOL 750 MG/1
750 TABLET, FILM COATED ORAL 4 TIMES DAILY PRN
Status: DISCONTINUED | OUTPATIENT
Start: 2025-01-30 | End: 2025-01-30 | Stop reason: HOSPADM

## 2025-01-30 RX ORDER — NALOXONE HYDROCHLORIDE 0.4 MG/ML
INJECTION, SOLUTION INTRAMUSCULAR; INTRAVENOUS; SUBCUTANEOUS PRN
Status: DISCONTINUED | OUTPATIENT
Start: 2025-01-30 | End: 2025-01-30 | Stop reason: HOSPADM

## 2025-01-30 RX ORDER — ACETAMINOPHEN 325 MG/1
650 TABLET ORAL EVERY 6 HOURS
Status: DISCONTINUED | OUTPATIENT
Start: 2025-01-30 | End: 2025-01-30 | Stop reason: HOSPADM

## 2025-01-30 RX ORDER — MIDAZOLAM HYDROCHLORIDE 2 MG/2ML
2 INJECTION, SOLUTION INTRAMUSCULAR; INTRAVENOUS
Status: COMPLETED | OUTPATIENT
Start: 2025-01-30 | End: 2025-01-30

## 2025-01-30 RX ORDER — ACETAMINOPHEN 500 MG
1000 TABLET ORAL EVERY 6 HOURS PRN
COMMUNITY

## 2025-01-30 RX ORDER — KETOROLAC TROMETHAMINE 30 MG/ML
INJECTION, SOLUTION INTRAMUSCULAR; INTRAVENOUS PRN
Status: DISCONTINUED | OUTPATIENT
Start: 2025-01-30 | End: 2025-01-30 | Stop reason: ALTCHOICE

## 2025-01-30 RX ORDER — HYDROMORPHONE HYDROCHLORIDE 1 MG/ML
0.5 INJECTION, SOLUTION INTRAMUSCULAR; INTRAVENOUS; SUBCUTANEOUS
Status: DISCONTINUED | OUTPATIENT
Start: 2025-01-30 | End: 2025-01-30 | Stop reason: HOSPADM

## 2025-01-30 RX ORDER — ROPIVACAINE HYDROCHLORIDE 2 MG/ML
INJECTION, SOLUTION EPIDURAL; INFILTRATION; PERINEURAL
Status: COMPLETED | OUTPATIENT
Start: 2025-01-30 | End: 2025-01-30

## 2025-01-30 RX ORDER — ONDANSETRON 2 MG/ML
4 INJECTION INTRAMUSCULAR; INTRAVENOUS EVERY 6 HOURS PRN
Status: DISCONTINUED | OUTPATIENT
Start: 2025-01-30 | End: 2025-01-30 | Stop reason: HOSPADM

## 2025-01-30 RX ORDER — LIDOCAINE HYDROCHLORIDE 10 MG/ML
1 INJECTION, SOLUTION INFILTRATION; PERINEURAL
Status: DISCONTINUED | OUTPATIENT
Start: 2025-01-30 | End: 2025-01-30 | Stop reason: HOSPADM

## 2025-01-30 RX ORDER — ROPIVACAINE HYDROCHLORIDE 2 MG/ML
INJECTION, SOLUTION EPIDURAL; INFILTRATION; PERINEURAL PRN
Status: DISCONTINUED | OUTPATIENT
Start: 2025-01-30 | End: 2025-01-30 | Stop reason: ALTCHOICE

## 2025-01-30 RX ORDER — ONDANSETRON 4 MG/1
4 TABLET, ORALLY DISINTEGRATING ORAL EVERY 8 HOURS PRN
Status: DISCONTINUED | OUTPATIENT
Start: 2025-01-30 | End: 2025-01-30 | Stop reason: HOSPADM

## 2025-01-30 RX ORDER — SENNA AND DOCUSATE SODIUM 50; 8.6 MG/1; MG/1
1 TABLET, FILM COATED ORAL 2 TIMES DAILY
Status: DISCONTINUED | OUTPATIENT
Start: 2025-01-30 | End: 2025-01-30 | Stop reason: HOSPADM

## 2025-01-30 RX ORDER — TRANEXAMIC ACID 100 MG/ML
INJECTION, SOLUTION INTRAVENOUS
Status: DISCONTINUED | OUTPATIENT
Start: 2025-01-30 | End: 2025-01-30 | Stop reason: SDUPTHER

## 2025-01-30 RX ORDER — SODIUM CHLORIDE, SODIUM LACTATE, POTASSIUM CHLORIDE, CALCIUM CHLORIDE 600; 310; 30; 20 MG/100ML; MG/100ML; MG/100ML; MG/100ML
INJECTION, SOLUTION INTRAVENOUS
Status: DISCONTINUED | OUTPATIENT
Start: 2025-01-30 | End: 2025-01-30 | Stop reason: SDUPTHER

## 2025-01-30 RX ORDER — EPHEDRINE SULFATE 50 MG/ML
INJECTION INTRAVENOUS
Status: DISCONTINUED | OUTPATIENT
Start: 2025-01-30 | End: 2025-01-30 | Stop reason: SDUPTHER

## 2025-01-30 RX ADMIN — PROPOFOL 50 MG: 10 INJECTION, EMULSION INTRAVENOUS at 07:08

## 2025-01-30 RX ADMIN — PHENYLEPHRINE HYDROCHLORIDE 100 MCG: 0.1 INJECTION, SOLUTION INTRAVENOUS at 07:21

## 2025-01-30 RX ADMIN — PHENYLEPHRINE HYDROCHLORIDE 100 MCG: 0.1 INJECTION, SOLUTION INTRAVENOUS at 08:05

## 2025-01-30 RX ADMIN — Medication 3 AMPULE: at 06:06

## 2025-01-30 RX ADMIN — LIDOCAINE HYDROCHLORIDE 50 MG: 20 INJECTION, SOLUTION EPIDURAL; INFILTRATION; INTRACAUDAL; PERINEURAL at 07:08

## 2025-01-30 RX ADMIN — OXYCODONE 10 MG: 5 TABLET ORAL at 09:42

## 2025-01-30 RX ADMIN — CEFAZOLIN 2000 MG: 1 INJECTION, POWDER, FOR SOLUTION INTRAVENOUS at 07:12

## 2025-01-30 RX ADMIN — EPHEDRINE SULFATE 10 MG: 50 INJECTION, SOLUTION INTRAVENOUS at 08:16

## 2025-01-30 RX ADMIN — ROPIVACAINE HYDROCHLORIDE 20 ML: 2 INJECTION, SOLUTION EPIDURAL; INFILTRATION at 06:43

## 2025-01-30 RX ADMIN — EPHEDRINE SULFATE 10 MG: 50 INJECTION, SOLUTION INTRAVENOUS at 07:37

## 2025-01-30 RX ADMIN — ACETAMINOPHEN 1000 MG: 500 TABLET, FILM COATED ORAL at 06:06

## 2025-01-30 RX ADMIN — MEPIVACAINE HYDROCHLORIDE 60 MG: 20 INJECTION, SOLUTION EPIDURAL; INFILTRATION at 07:02

## 2025-01-30 RX ADMIN — MIDAZOLAM 2 MG: 1 INJECTION INTRAMUSCULAR; INTRAVENOUS at 06:44

## 2025-01-30 RX ADMIN — EPHEDRINE SULFATE 10 MG: 50 INJECTION, SOLUTION INTRAVENOUS at 08:30

## 2025-01-30 RX ADMIN — SODIUM CHLORIDE, SODIUM LACTATE, POTASSIUM CHLORIDE, AND CALCIUM CHLORIDE: 600; 310; 30; 20 INJECTION, SOLUTION INTRAVENOUS at 06:52

## 2025-01-30 RX ADMIN — OXYCODONE 10 MG: 5 TABLET ORAL at 13:38

## 2025-01-30 RX ADMIN — PHENYLEPHRINE HYDROCHLORIDE 100 MCG: 0.1 INJECTION, SOLUTION INTRAVENOUS at 08:07

## 2025-01-30 RX ADMIN — TRANEXAMIC ACID 1000 MG: 100 INJECTION, SOLUTION INTRAVENOUS at 07:14

## 2025-01-30 RX ADMIN — ACETAMINOPHEN 650 MG: 325 TABLET, FILM COATED ORAL at 11:35

## 2025-01-30 RX ADMIN — SODIUM CHLORIDE, POTASSIUM CHLORIDE, SODIUM LACTATE AND CALCIUM CHLORIDE 125 ML/HR: 600; 310; 30; 20 INJECTION, SOLUTION INTRAVENOUS at 06:05

## 2025-01-30 RX ADMIN — PROPOFOL 100 MCG/KG/MIN: 10 INJECTION, EMULSION INTRAVENOUS at 07:09

## 2025-01-30 ASSESSMENT — PAIN - FUNCTIONAL ASSESSMENT
PAIN_FUNCTIONAL_ASSESSMENT: ACTIVITIES ARE NOT PREVENTED
PAIN_FUNCTIONAL_ASSESSMENT: ACTIVITIES ARE NOT PREVENTED
PAIN_FUNCTIONAL_ASSESSMENT: 0-10

## 2025-01-30 ASSESSMENT — PAIN SCALES - GENERAL
PAINLEVEL_OUTOF10: 2
PAINLEVEL_OUTOF10: 0
PAINLEVEL_OUTOF10: 6
PAINLEVEL_OUTOF10: 2
PAINLEVEL_OUTOF10: 4
PAINLEVEL_OUTOF10: 1
PAINLEVEL_OUTOF10: 5

## 2025-01-30 ASSESSMENT — PAIN DESCRIPTION - DESCRIPTORS
DESCRIPTORS: OTHER (COMMENT)
DESCRIPTORS: ACHING
DESCRIPTORS: ACHING

## 2025-01-30 ASSESSMENT — PAIN DESCRIPTION - ORIENTATION
ORIENTATION: RIGHT
ORIENTATION: RIGHT

## 2025-01-30 ASSESSMENT — PAIN DESCRIPTION - LOCATION
LOCATION: KNEE
LOCATION: KNEE

## 2025-01-30 NOTE — ANESTHESIA PROCEDURE NOTES
Spinal Block    Patient location during procedure: OR  End time: 1/30/2025 7:06 AM  Reason for block: primary anesthetic  Staffing  Performed: anesthesiologist   Anesthesiologist: Suzette Gardner MD  Performed by: Suzette Gardner MD  Authorized by: Suzette Gardner MD    Spinal Block  Patient position: sitting  Prep: ChloraPrep  Patient monitoring: cardiac monitor, continuous pulse ox and frequent blood pressure checks  Approach: midline  Location: L3/L4  Provider prep: mask and sterile gloves  Local infiltration: lidocaine  Needle  Needle type: pencil-tip   Needle gauge: 25 G  Needle length: 3.5 in  Assessment  Events: SAB placement uncomplicated.  No paresthesia.  Swirl obtained: Yes  CSF: clear  Attempts: 1  Hemodynamics: stable  Additional Notes  3 cc 1% lidocaine local injected at needle insertion site.  Risks/benefits/alternatives discussed including damage to muscle or nerve, bleeding, infection, and a reaction to our medications.    Preanesthetic Checklist  Completed: patient identified, IV checked, risks and benefits discussed, equipment checked, pre-op evaluation, timeout performed, anesthesia consent given, oxygen available and monitors applied/VS acknowledged

## 2025-01-30 NOTE — ANESTHESIA PROCEDURE NOTES
Peripheral Block    Patient location during procedure: pre-op  Reason for block: post-op pain management and at surgeon's request  Start time: 1/30/2025 6:43 AM  End time: 1/30/2025 6:46 AM  Staffing  Performed: anesthesiologist   Anesthesiologist: Suzette Gardner MD  Performed by: Suzette Gardner MD  Authorized by: Suzette Gardner MD    Preanesthetic Checklist  Completed: patient identified, site marked, risks and benefits discussed, equipment checked, pre-op evaluation, timeout performed, anesthesia consent given, oxygen available and monitors applied/VS acknowledged  Peripheral Block   Prep: ChloraPrep  Provider prep: mask and sterile gloves  Patient monitoring: cardiac monitor, continuous pulse ox, oxygen, IV access, frequent blood pressure checks and responsive to questions  Block type: Femoral  Adductor canal  Laterality: right  Injection technique: single-shot  Guidance: ultrasound guided  Local infiltration: lidocaine  Local infiltration: lidocaine    Needle   Needle type: insulated echogenic nerve stimulator needle   Needle gauge: 20 G  Needle localization: ultrasound guidance  Needle length: 10 cm  Assessment   Injection assessment: negative aspiration for heme, no paresthesia on injection, local visualized surrounding nerve on ultrasound and no intravascular symptoms  Slow fractionated injection: yes  Hemodynamics: stable  Outcomes: patient tolerated procedure well and uncomplicated    Additional Notes  Risks/benefits/alternatives discussed including damage to nerve or nerve, bleeding, infection, and a reaction to our medications.    3 cc 1% lidocaine local injected at needle insertion site.  Needle inserted and placed in close proximity to the nerve under real time ultrasound guidance.  Ultrasound was used to visualize the spread of local anesthetic in close proximity to the nerve being blocked.  The nerve appeared anatomically normal and there were no abnormal findings.

## 2025-01-30 NOTE — OP NOTE
thoroughly irrigated.      The capsular layer was closed using a #1 PDS suture and a #1 Vicryl. Then, 1 gram (100 mg/ml) of Transexamic Acid and 1 gram of Vancomycin was injected into the joint space. The subcutaneous layers were closed using 2-0 vicryl.  The skin was closed using staples which were applied in occlusive fashion and sterile bandage applied.  An Iceman cryo pad was applied on the operative leg.  Sponge count and needle counts were correct.  Brent Lamas Jr. left the operating room     Implants:   Implant Name Type Inv. Item Serial No.  Lot No. LRB No. Used Action   COMPONENT FEM SZ 5 R KNEE CRUCE RET CEMENTLESS BEAD W/ AGUSTO - [id number]  COMPONENT FEM SZ 5 R KNEE CRUCE RET CEMENTLESS BEAD W/ AGUSTO  [employer] Revivio [id number] Right 1 Implanted   BASEPLATE TIB SZ 6 AP52MM ML77MM KNEE TRITANIUM 4 CRUCFRM - [id number]  BASEPLATE TIB SZ 6 AP52MM ML77MM KNEE TRITANIUM 4 CRUCFRM  [employer] [id number] Right 1 Implanted   INSERT TIB CNDYL STBL 6 9 MM KNEE 5/PK X3 TRIATHLON - [id number]  INSERT TIB CNDYL STBL 6 9 MM KNEE 5/PK X3 TRIATHLON  [employer] Rev[employer]io [id number] 1 Implanted         Signed By: Uvaldo [place] MD   1/30/2025,  8:13 AM

## 2025-01-30 NOTE — PROGRESS NOTES
Spiritual care and pre-op prayer given to patient.    Anum Alford M.Div, [facility] / / Bereavement Coordinator  Spiritual Care Department   c: [phone]/ [phone] / [email or web address]     [address] [address]  [email or web address]

## 2025-01-30 NOTE — H&P
The patient has end stage arthritis of the right knee. The patient was see and examined and there are no changes to the patient's orthopedic condition. They have tried conservative treatment for this condition; including antiinflammatories and lifestyle modifications and have failed. The necessity for the joint replacement is still present, and the H&P from the office is still current. The patient is admitted today forProcedure(s) (LRB):  KNEE TOTAL ARTHROPLASTY ROBOTIC-right-juma (Right) .      
of joint space and bone spurs.    Assessment: Osteoarthritis of right knee [M17.11]    Plan:  Proceed with scheduled Procedure(s) (LRB):  KNEE TOTAL ARTHROPLASTY ROBOTIC-right-juma (Right) .  The patient has failed conservative treatment including NSAIDS, and injections.  Total joint replacement surgery and associated risks and benefits have been discussed with the patient along with implant selection including but not limited to Claudette and DePuy total joint systems.  Due to the amount of pain the patient is experiencing we will proceed with the scheduled procedure.  Will plan for same day discharge.    Signed By: LILA Arnold  January 27, 2025

## 2025-01-30 NOTE — PROGRESS NOTES
TRANSFER - IN REPORT:    Verbal report received from Key VALENCIA on Brent Lamas .  being received from PACU for routine post-op      Report consisted of patient's Situation, Background, Assessment and   Recommendations(SBAR).     Information from the following report(s) Nurse Handoff Report, Adult Overview, MAR, and Cardiac Rhythm NSR  was reviewed with the receiving nurse.    Opportunity for questions and clarification was provided.

## 2025-01-30 NOTE — DISCHARGE INSTRUCTIONS
[facility]      Patient Discharge Instructions    Brent Lamas JrWalter/[id number] : 1950    Admitted 2025 Discharged: 2025       IF YOU HAVE ANY PROBLEMS ONCE YOU ARE AT HOME CALL THE FOLLOWING NUMBERS:   Main office number: [phone]      Medications    The medications you are to continue on are listed on the medication reconciliation sheet.   Narcotic pain medications as well as supplemental iron can cause constipation. If this occurs try stopping the narcotic pain medication and/or the iron.   It is important that you take the medication exactly as they are prescribed.  Medications which increase your risk of blood clots are listed to stop for 5 weeks after surgery as well as medications or supplements which increase your risk of bleeding complications.   Keep your medication in the bottles provided by the pharmacist and keep a list of the medication names, dosages, and times to be taken in your wallet.   Do not take other medications without consulting your doctor.       Important Information    Do NOT smoke as this will greatly increase your risk of infection!    Resume your prehospital diet. If you have excessive nausea or vomitting call your doctor's office     Leg swelling and warmth is normal for 6 months after surgery. If you experience swelling in your leg elevate you leg while laying down with your toes above your heart. If you have sudden onset severe swelling with leg pain call our office. Use Dariusz Hose stockings until we see you in the office for your follow up appointment.    The stitches deep inside take approximately 6 months to dissolve. There will be sharp shooting, stinging and burning pain. This is normal and will resolve between 3-6 months after surgery.     Difficulty sleeping is normal following total Knee and Hip replacement. You may try melatonin, an over-the-counter sleep aid or benadryl to help with sleep. Most patients will resume sleeping through the

## 2025-01-30 NOTE — CARE COORDINATION
Patient is a 75 y.o. year old male admitted for Right TKA . Patient plans to return home on discharge. Order received to arrange home health. Patient without preference towards agency. Referral sent to [facility].  Patient requesting we arrange a walker. Pt without preference towards provider. Referral sent to [facility]. Equipment delivered to the hospital room prior to discharge. Will follow until discharge.      01/30/25 1261   Service Assessment   Patient Orientation Alert and Oriented   Cognition Alert   History Provided By Patient   Services At/After Discharge   Transition of Care Consult (CM Consult) Home Health   Internal Home Health Yes   Services At/After Discharge Home Health;PT   Mode of Transport at Discharge Self   Confirm Follow Up Transport Self   Condition of Participation: Discharge Planning   The Plan for Transition of Care is related to the following treatment goals: improve mobility   The Patient and/or Patient Representative was provided with a Choice of Provider? Patient   The Patient and/Or Patient Representative agree with the Discharge Plan? Yes   Freedom of Choice list was provided with basic dialogue that supports the patient's individualized plan of care/goals, treatment preferences, and shares the quality data associated with the providers?  Yes

## 2025-01-30 NOTE — ANESTHESIA POSTPROCEDURE EVALUATION
Department of Anesthesiology  Postprocedure Note    Patient: Brent Lamas Jr.  MRN: [id number]  YOB: 1950  Date of evaluation: 1/30/2025    Procedure Summary       Date: 01/30/25 Room / Location: [facility] MAIN OR 06 / [facility] MAIN OR    Anesthesia Start: 0652 Anesthesia Stop: 0846    Procedure: KNEE TOTAL ARTHROPLASTY ROBOTIC-right-juma (Right: Knee) Diagnosis:       Osteoarthritis of right knee      (Osteoarthritis of right knee [M17.11])    Surgeons: Kenny [place] MD Responsible Provider: Suzette Gardner MD    Anesthesia Type: TIVA, regional, spinal ASA Status: 3            Anesthesia Type: No value filed.    Wendy Phase I: Wendy Score: 9    Wendy Phase II:      Anesthesia Post Evaluation    Patient location during evaluation: PACU  Patient participation: complete - patient participated  Level of consciousness: awake and alert  Airway patency: patent  Nausea & Vomiting: no nausea and no vomiting  Cardiovascular status: hemodynamically stable  Respiratory status: acceptable, nonlabored ventilation and spontaneous ventilation  Hydration status: euvolemic  Comments: /68   Pulse 65   Temp 97.3 °F (36.3 °C)   Resp 16   Ht 1.829 m (6')   Wt 96.1 kg (211 lb 14.4 oz)   SpO2 91%   BMI 28.74 kg/m²     Multimodal analgesia pain management approach  Pain management: adequate and satisfactory to patient    No notable events documented.

## 2025-01-30 NOTE — PROGRESS NOTES
ACUTE PHYSICAL THERAPY GOALS:   (Developed with and agreed upon by patient and/or caregiver.)  GOALS (1-4 days):  (1.)Mr. Lamas will move from supine to sit and sit to supine  in bed with SUPERVISION.  (2.)Mr. Lamas will transfer from bed to chair and chair to bed with SUPERVISION using the least restrictive device.  (3.)Mr. Lamas will ambulate with SUPERVISION for 200 feet with the least restrictive device.  (4.)Mr. Lamas will ambulate up/down 3 steps with right railing with cane with CONTACT GUARD ASSIST.met  (5.)Mr. Lamas will increase right knee ROM to 3-100°.  ________________________________________________________________________________________________           PHYSICAL THERAPY: TOTAL KNEE ARTHROPLASTY Initial Assessment and PM  (Link to Caseload Tracking: PT Visit Days : 1  Acknowledge Orders  Time In/Out  PT Charge Capture  Rehab Caseload Tracker  Episode   Brent Lamas Jr. is a 75 y.o. male   PRIMARY DIAGNOSIS: Osteoarthritis of right knee  Osteoarthritis of right knee [M17.11]  Osteoarthritis of right knee, unspecified osteoarthritis type [M17.11]  Procedure(s) (LRB):  KNEE TOTAL ARTHROPLASTY ROBOTIC-right-juma (Right)  Day of Surgery  Reason for Referral: Pain in Right Knee (M25.561)  Stiffness of Right Knee, Not elsewhere classified (M25.661)  Difficulty in walking, Not elsewhere classified (R26.2)  Outpatient in a bed: Payor: MEDICARE / Plan: MEDICARE PART A AND B / Product Type: *No Product type* /     REHAB RECOMMENDATIONS:   Recommendation to date pending progress:  Setting:  Home Health Therapy    Equipment:     Has RW     RANGE OF MOTION:   Right Knee Flexion: R Knee Flexion (0-145): 90  Right Knee Extension: R Knee Extension (0): 5     GAIT: I Mod I S SBA CGA Min Mod Max Total  NT x2 Comments:   Level of Assistance [] [] [] [x] [] [] [] [] [] [] []            Weightbearing Status  Right Lower Extremity Weight Bearing: Weight Bearing As Tolerated    Distance  200 feet

## 2025-01-30 NOTE — PERIOP NOTE
TRANSFER - OUT REPORT:    Verbal report given to Valentina VALENCIA on Brent Lamas Jr.  being transferred to [facility] for routine progression of patient care       Report consisted of patient's Situation, Background, Assessment and   Recommendations(SBAR).     Information from the following report(s) Nurse Handoff Report was reviewed with the receiving nurse.    Lines:   Peripheral IV 01/30/25 Left;Posterior Hand (Active)   Site Assessment Clean, dry & intact 01/30/25 0901   Line Status Normal saline locked 01/30/25 0901   Line Care Connections checked and tightened 01/30/25 0606   Phlebitis Assessment No symptoms 01/30/25 0901   Infiltration Assessment 0 01/30/25 0901   Alcohol Cap Used Yes 01/30/25 0901   Dressing Status Clean, dry & intact 01/30/25 0901   Dressing Type Transparent 01/30/25 0901        Opportunity for questions and clarification was provided.      Patient transported with:

## 2025-01-30 NOTE — PERIOP NOTE
Teach back method used in review of Hibiclens usage preop/postop, TB screening, pain management goals, falls precautions and use of Nozin for prevention of staph infections.    Incentive spirometer reviewed and LOCATED IN pt'S BELONGINGS.

## 2025-01-30 NOTE — ANESTHESIA PRE PROCEDURE
methocarbamol (ROBAXIN-750) 750 MG tablet Take 1 tablet by mouth 4 times daily as needed (muscle spasms)  Patient not taking: Reported on 1/30/2025 1/27/25 2/6/25  José Luis Zhou PA   baclofen (LIORESAL) 10 MG tablet Take 0.5 tablets by mouth nightly as needed (muscle spasms) 10/18/24   Julieta Proctor APRN - NP   naproxen (NAPROSYN) 500 MG tablet Take 1 tablet by mouth 2 times daily (with meals) for 14 days 10/18/24 12/6/24  Julieta Proctor APRN - NP       Current medications:    Current Facility-Administered Medications   Medication Dose Route Frequency Provider Last Rate Last Admin    sodium chloride flush 0.9 % injection 5-40 mL  5-40 mL IntraVENous 2 times per day José Luis Zhou PA        sodium chloride flush 0.9 % injection 5-40 mL  5-40 mL IntraVENous PRN José Luis Zhou PA        0.9 % sodium chloride infusion   IntraVENous PRN José Luis Zhou PA        ceFAZolin (ANCEF) 2,000 mg in sterile water 20 mL IV syringe  2,000 mg IntraVENous On Call to OR José Luis Zhou PA        lidocaine 1 % injection 1 mL  1 mL IntraDERmal Once PRN Suzette Gardner MD        lactated ringers infusion   IntraVENous Continuous Suzette Gardner  mL/hr at 01/30/25 0605 125 mL/hr at 01/30/25 0605    sodium chloride flush 0.9 % injection 5-40 mL  5-40 mL IntraVENous 2 times per day Suzette Gardner MD        sodium chloride flush 0.9 % injection 5-40 mL  5-40 mL IntraVENous PRN Suzette Gardner MD        0.9 % sodium chloride infusion   IntraVENous PRN Suzette Gardner MD           Allergies:  No Known Allergies    Problem List:    Patient Active Problem List   Diagnosis Code    Dyslipidemia E78.5    Nonspecific abnormal finding in stool contents R19.5    Aortic valve disorder I35.9    Nonrheumatic aortic valve insufficiency I35.1    Chest pain R07.9    Cysts of eyelids H02.829    Essential hypertension I10    Calculus of kidney N20.0    Insomnia, unspecified G47.00    Restless legs

## 2025-01-30 NOTE — PROGRESS NOTES
OCCUPATIONAL THERAPY Initial Assessment, Daily Note, and AM      (Link to Caseload Tracking: OT Visit Days: 1  OT Orders   Time  OT Charge Capture  Rehab Caseload Tracker  Episode     Brent Lamas Jr. is a 75 y.o. male   PRIMARY DIAGNOSIS: Osteoarthritis of right knee  Osteoarthritis of right knee [M17.11]  Osteoarthritis of right knee, unspecified osteoarthritis type [M17.11]  Procedure(s) (LRB):  KNEE TOTAL ARTHROPLASTY ROBOTIC-right-juma (Right)  Day of Surgery  Reason for Referral: Pain in Right Knee (M25.561)  Stiffness of Right Knee, Not elsewhere classified (M25.661)  Other lack of cordination (R27.8)  Difficulty in walking, Not elsewhere classified (R26.2)  Other abnormalities of gait and mobility (R26.89)  Outpatient in a bed: Payor: MEDICARE / Plan: MEDICARE PART A AND B / Product Type: *No Product type* /     ASSESSMENT:     REHAB RECOMMENDATIONS:   Recommendation to date pending progress:  Setting:  No further skilled occupational therapy after discharge from hospital    Equipment:    Rolling Walker     ASSESSMENT:  Mr. Lamas is s/p right TKA and presents with decreased independence with functional mobility and activities of daily living as compared to baseline level of function and safety. Patient would benefit from skilled Occupational Therapy to maximize independence and safety with self-care task and functional mobility.   Patient supine in stand by assist supine to sit. HE was assisted with getting dressed. He stood with contact guard assist and completed clothing management. He ambulated with the walker to the recliner with CGA. He and his wife were educated on OT plan of care, discharge planning, adl task performance, post op showering, resting joint position, ice machine, walker use and home safety. He ambulated to the bathroom with contact guard assist and attempted to urinate unsuccessfully. He returned to recliner and was set up with his ice machine. He plans to discharge home today

## 2025-01-31 ENCOUNTER — TELEPHONE (OUTPATIENT)
Dept: ORTHOPEDICS UNIT | Age: 75
End: 2025-01-31

## 2025-01-31 NOTE — TELEPHONE ENCOUNTER
Called to follow up after TKA with SDD on 1/30/25.  Spoke with wife.  Doing fine.  Post op pain is managed.  [facility] SOC visit is scheduled for 1430.  Reviewed importance of ambulating at least every 45 minutes to an hour during the day.  Reviewed miralax protocol.  Reviewed contact number for ortho navigator.

## 2025-02-05 RX ORDER — METHOCARBAMOL 750 MG/1
750 TABLET, FILM COATED ORAL 4 TIMES DAILY PRN
Qty: 30 TABLET | Refills: 0 | OUTPATIENT
Start: 2025-02-05 | End: 2025-02-15

## 2025-02-06 DIAGNOSIS — I10 ESSENTIAL HYPERTENSION: ICD-10-CM

## 2025-02-06 DIAGNOSIS — I42.1 HOCM (HYPERTROPHIC OBSTRUCTIVE CARDIOMYOPATHY) (HCC): ICD-10-CM

## 2025-02-06 RX ORDER — METOPROLOL SUCCINATE 100 MG/1
100 TABLET, EXTENDED RELEASE ORAL DAILY
Qty: 90 TABLET | Refills: 3 | OUTPATIENT
Start: 2025-02-06

## 2025-02-07 DIAGNOSIS — Z96.651 S/P TOTAL KNEE ARTHROPLASTY, RIGHT: Primary | ICD-10-CM

## 2025-02-07 RX ORDER — OXYCODONE HYDROCHLORIDE 5 MG/1
5 TABLET ORAL
Qty: 30 TABLET | Refills: 0 | Status: SHIPPED | OUTPATIENT
Start: 2025-02-07 | End: 2025-02-12

## 2025-02-07 RX ORDER — METHOCARBAMOL 750 MG/1
750 TABLET, FILM COATED ORAL 4 TIMES DAILY PRN
Qty: 20 TABLET | Refills: 0 | Status: SHIPPED | OUTPATIENT
Start: 2025-02-07

## 2025-02-13 DIAGNOSIS — Z96.651 S/P TOTAL KNEE ARTHROPLASTY, RIGHT: Primary | ICD-10-CM

## 2025-02-28 ENCOUNTER — OFFICE VISIT (OUTPATIENT)
Dept: ORTHOPEDIC SURGERY | Age: 75
End: 2025-02-28

## 2025-02-28 DIAGNOSIS — Z96.651 S/P TOTAL KNEE ARTHROPLASTY, RIGHT: Primary | ICD-10-CM

## 2025-02-28 NOTE — PROGRESS NOTES
Name: Brent Lamas Jr.  YOB: 1950  Gender: male  MRN: [id number]      Current Outpatient Medications:     methocarbamol (ROBAXIN-750) 750 MG tablet, Take 1 tablet by mouth 4 times daily as needed (PRN), Disp: 20 tablet, Rfl: 0    acetaminophen (TYLENOL) 500 MG tablet, Take 2 tablets by mouth every 6 hours as needed for Pain, Disp: , Rfl:     Testosterone (ANDROGEL) 20.25 MG/ACT (1.62%) GEL gel, Place 2 actuation onto the skin daily. *HOLD ANDROGEL UNTIL MARCH 1ST.*    APPLY 2 DEPRESSIONS TO UPPER ARM IN THE MORNING BEFORE 9 AM Max Daily Amount: 40.5 mg, Disp: 75 g, Rfl: 2    aspirin 81 MG EC tablet, Take 1 tablet by mouth 2 times daily, Disp: 70 tablet, Rfl: 0    ondansetron (ZOFRAN) 4 MG tablet, Take 1 tablet by mouth every 8 hours as needed for Nausea or Vomiting, Disp: 20 tablet, Rfl: 0    sertraline (ZOLOFT) 50 MG tablet, Take 1 tablet by mouth daily, Disp: 90 tablet, Rfl: 3    simvastatin (ZOCOR) 40 MG tablet, Take 1 tablet by mouth nightly, Disp: 90 tablet, Rfl: 3    metoprolol succinate (TOPROL XL) 100 MG extended release tablet, Take 1 tablet by mouth daily, Disp: 90 tablet, Rfl: 3    Multiple Vitamin (MULTIVITAMIN ADULT PO), Take 1 tablet by mouth daily, Disp: , Rfl:   No Known Allergies    Post-op right TKA    The patient returns now 4 weeks post right total knee arthroplasty.  Their pain is diminishing and the wound healing.   Their range of motion is improving.    Radiographs: AP/Lateral and sunrise of the right knee taken in the office today reveal a good bone, prosthetic appearance.  The patella is balanced.           Radiographic Diagnosis:  Normal post-operative right total knee.    Recommendations:  Reviewed x-ray findings with the patient.  They are to increase their weight bearing status as tolerated. A cane can be used in transition.  They are to follow the routine rehabilitation protocol.  They are to wean from their pain medications as tolerated.  He will follow-up in 5 months

## 2025-03-03 ENCOUNTER — HOSPITAL ENCOUNTER (OUTPATIENT)
Dept: PHYSICAL THERAPY | Age: 75
Setting detail: RECURRING SERIES
Discharge: HOME | End: 2025-03-06
Attending: ORTHOPAEDIC SURGERY
Payer: MEDICARE

## 2025-03-03 DIAGNOSIS — M25.561 RIGHT KNEE PAIN, UNSPECIFIED CHRONICITY: ICD-10-CM

## 2025-03-03 DIAGNOSIS — Z96.651 S/P TOTAL KNEE ARTHROPLASTY, RIGHT: ICD-10-CM

## 2025-03-03 DIAGNOSIS — M25.661 STIFFNESS OF RIGHT KNEE, NOT ELSEWHERE CLASSIFIED: Primary | ICD-10-CM

## 2025-03-03 PROCEDURE — 97161 PT EVAL LOW COMPLEX 20 MIN: CPT

## 2025-03-03 PROCEDURE — 97110 THERAPEUTIC EXERCISES: CPT

## 2025-03-03 RX ORDER — METHOCARBAMOL 750 MG/1
750 TABLET, FILM COATED ORAL 4 TIMES DAILY PRN
Qty: 20 TABLET | Refills: 0 | Status: SHIPPED | OUTPATIENT
Start: 2025-03-03

## 2025-03-03 ASSESSMENT — PAIN SCALES - GENERAL: PAINLEVEL_OUTOF10: 5
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
## 2025-03-03 NOTE — THERAPY EVALUATION
denies any saddle paresthesia or bowel/bladder deficits.     Observation/Orthostatic Postural Assessment:          Ambulates into therapy with single point cane with antalgic gait pattern. Incision to R knee is healed with mild scabbing present.       ROM:            AROM/ PROM Left (degrees) Right (degrees)   Knee Flexion 130 110 A / 117 P   Knee Extension 0 3 degrees lacking full from full extension   Ankle Dorsiflexion (DF) -   knee extended 15 15     Strength:            Motion Tested Left   (*/5) Right  (*/5)   Knee Extension 5 4+   Knee Flexion 5 4+   Hip Flexion 5 4+   Hip Extension 5 5   Hip Abduction 5 5   Ankle DF 5 5       Passive Accessory Motion:         Good R patellar mobility in all planes. Mild guarding with passive ROM to R knee in sitting and supine.    Neurological Screen:              Myotomes: Key muscle strength testing through bilateral LE is WFL.   Dermatomes: Sensation to light touch for bilateral LE is grossly intact from L2 to L5.       Functional Mobility:         Gait/Ambulation:  Independent/mod I with single point cane per observations above        Transfers:  Independent, uses hands to push himself up from standard chair        Bed Mobility:  Independent        Stairs:  Able to ascend/descend stairs with step-over-step gait pattern with mildly reduced eccentric control with R LE              Outcome Measure:   Tool Used: Knee injury and Osteoarthritis Outcome Score for Joint Replacement (KOOS, JR)  Score:  Initial: 6 (Interval: 70.704) 3/3/2025 Most Recent: TBD   Interpretation of Score:  The KOOS, JR contains 7 items from the original KOOS survey. Items are coded from 0 to 4, none to extreme respectively.   KOOS, JR is scored by summing the raw response (range 0-28) and then converting it to an interval score using the table provided below. The interval score ranges from 0 to 100 where 0 represents total knee disability and 100 represents perfect knee health.    Tool Used: Timed “Up

## 2025-03-05 ENCOUNTER — HOSPITAL ENCOUNTER (OUTPATIENT)
Dept: PHYSICAL THERAPY | Age: 75
Setting detail: RECURRING SERIES
Discharge: HOME | End: 2025-03-08
Attending: ORTHOPAEDIC SURGERY
Payer: MEDICARE

## 2025-03-05 PROCEDURE — 97110 THERAPEUTIC EXERCISES: CPT

## 2025-03-05 ASSESSMENT — PAIN SCALES - GENERAL: PAINLEVEL_OUTOF10: 5

## 2025-03-06 RX ORDER — IBUPROFEN 600 MG/1
600 TABLET, FILM COATED ORAL EVERY 8 HOURS PRN
Qty: 120 TABLET | Refills: 1 | Status: SHIPPED | OUTPATIENT
Start: 2025-03-06

## 2025-03-11 ENCOUNTER — HOSPITAL ENCOUNTER (OUTPATIENT)
Dept: PHYSICAL THERAPY | Age: 75
Setting detail: RECURRING SERIES
Discharge: HOME | End: 2025-03-14
Attending: ORTHOPAEDIC SURGERY
Payer: MEDICARE

## 2025-03-11 PROCEDURE — 97110 THERAPEUTIC EXERCISES: CPT

## 2025-03-11 ASSESSMENT — PAIN SCALES - GENERAL: PAINLEVEL_OUTOF10: 4

## 2025-03-11 NOTE — PROGRESS NOTES
Brent Lamas Jr.  : 1950  Primary: Medicare Part A And B (Medicare)  Secondary: [facility] [place]  [address]  [facility] [address]  Phone: [phone]  Fax: [phone] Plan Frequency: 1-2 visits per week    Plan of Care/Certification Expiration Date: 25        Plan of Care/Certification Expiration Date:  Plan of Care/Certification Expiration Date: 25    Frequency/Duration: Plan Frequency: 1-2 visits per week      Time In/Out:   Time In: 1330  Time Out: 1420      PT Visit Info:    Progress Note Counter: 3      Visit Count:  3    OUTPATIENT PHYSICAL THERAPY:   Treatment Note 3/11/2025       Episode  (R TKA outpatient)               Treatment Diagnosis:    Stiffness of right knee, not elsewhere classified M25.661  Right knee pain, unspecified chronicity M25.561  S/P total knee arthroplasty, right Z96.651  Medical/Referring Diagnosis:    S/P total knee arthroplasty, right [Z96.651]    Referring Physician:  Kenny Diana MD MD Orders:  PT Eval and Treat   Return MD Appt:  25   Date of Onset:  25   Allergies:   Patient has no known allergies.  Restrictions/Precautions:   None      Interventions Planned (Treatment may consist of any combination of the following):     See Assessment Note    Subjective Comments:   States that his knee is feeling a little better and he has noticed improved gait mechanics.   Initial Pain Level:     4/10  Post Session Pain Level:   3-4   /10  Medications Last Reviewed: 3/11/2025  Updated Objective Findings:  Less antalgic gait pattern noted  Treatment     THERAPEUTIC EXERCISE: (40 minutes):    Exercises per grid below to improve mobility, strength, and balance.  Required moderate visual, verbal, manual, and tactile cues to promote proper body alignment, promote proper body posture, and promote proper body mechanics.  Progressed resistance, range, and repetitions as indicated.

## 2025-03-13 ENCOUNTER — HOSPITAL ENCOUNTER (OUTPATIENT)
Dept: PHYSICAL THERAPY | Age: 75
Setting detail: RECURRING SERIES
Discharge: HOME | End: 2025-03-16
Attending: ORTHOPAEDIC SURGERY
Payer: MEDICARE

## 2025-03-13 PROCEDURE — 97110 THERAPEUTIC EXERCISES: CPT

## 2025-03-13 NOTE — PROGRESS NOTES
Brent Lamas Jr.  : 1950  Primary: Medicare Part A And B (Medicare)  Secondary: [facility] [place]  [address]  [facility] [address]  Phone: [phone]  Fax: [phone] Plan Frequency: 1-2 visits per week    Plan of Care/Certification Expiration Date: 25        Plan of Care/Certification Expiration Date:  Plan of Care/Certification Expiration Date: 25    Frequency/Duration: Plan Frequency: 1-2 visits per week      Time In/Out:   Time In: 0900  Time Out: 0950      PT Visit Info:    Progress Note Counter: 4      Visit Count:  4    OUTPATIENT PHYSICAL THERAPY:   Treatment Note 3/13/2025       Episode  (R TKA outpatient)               Treatment Diagnosis:    Stiffness of right knee, not elsewhere classified M25.661  Right knee pain, unspecified chronicity M25.561  S/P total knee arthroplasty, right Z96.651  Medical/Referring Diagnosis:    S/P total knee arthroplasty, right [Z96.651]    Referring Physician:  Kenny Diana MD MD Orders:  PT Eval and Treat   Return MD Appt:  25   Date of Onset:  25   Allergies:   Patient has no known allergies.  Restrictions/Precautions:   None      Interventions Planned (Treatment may consist of any combination of the following):     See Assessment Note    Subjective Comments:   States that he was pretty sore after his last PT appointment but he has no knee pain today.   Initial Pain Level:   0   /10  Post Session Pain Level:   0   /10  Medications Last Reviewed: 3/13/2025  Updated Objective Findings:  R knee AROM: 0-124 degrees. Ascended/descended stairs with reciprocal gait pattern with use of handrails.  Treatment     THERAPEUTIC EXERCISE: (50 minutes):    Exercises per grid below to improve mobility, strength, and balance.  Required moderate visual, verbal, manual, and tactile cues to promote proper body alignment, promote proper body posture, and promote proper body

## 2025-03-18 ENCOUNTER — HOSPITAL ENCOUNTER (OUTPATIENT)
Dept: PHYSICAL THERAPY | Age: 75
Setting detail: RECURRING SERIES
Discharge: HOME | End: 2025-03-21
Attending: ORTHOPAEDIC SURGERY
Payer: MEDICARE

## 2025-03-18 PROCEDURE — 97110 THERAPEUTIC EXERCISES: CPT

## 2025-03-18 NOTE — PROGRESS NOTES
Brent Lamas Jr.  : 1950  Primary: Medicare Part A And B (Medicare)  Secondary: [facility] [place]  [address]  [facility] [address]  Phone: [phone]  Fax: [phone] Plan Frequency: 1-2 visits per week    Plan of Care/Certification Expiration Date: 25        Plan of Care/Certification Expiration Date:  Plan of Care/Certification Expiration Date: 25    Frequency/Duration: Plan Frequency: 1-2 visits per week      Time In/Out:   Time In: 1330  Time Out: 1424      PT Visit Info:    Progress Note Counter: 5      Visit Count:  5    OUTPATIENT PHYSICAL THERAPY:   Treatment Note 3/18/2025       Episode  (R TKA outpatient)               Treatment Diagnosis:    Stiffness of right knee, not elsewhere classified M25.661  Right knee pain, unspecified chronicity M25.561  S/P total knee arthroplasty, right Z96.651  Medical/Referring Diagnosis:    S/P total knee arthroplasty, right [Z96.651]    Referring Physician:  Kenny Diana MD MD Orders:  PT Eval and Treat   Return MD Appt:  25   Date of Onset:  25   Allergies:   Patient has no known allergies.  Restrictions/Precautions:   None      Interventions Planned (Treatment may consist of any combination of the following):     See Assessment Note    Subjective Comments:   States that he was pretty sore after his last PT appointment but he has no knee pain today.   Initial Pain Level:   0   /10  Post Session Pain Level:   0   /10  Medications Last Reviewed: 3/18/2025  Updated Objective Findings:  Mildly antalgic gait pattern upon arrival to PT  Treatment     THERAPEUTIC EXERCISE: (54 minutes):    Exercises per grid below to improve mobility, strength, and balance.  Required moderate visual, verbal, manual, and tactile cues to promote proper body alignment, promote proper body posture, and promote proper body mechanics.  Progressed resistance, range, and repetitions as

## 2025-03-20 ENCOUNTER — HOSPITAL ENCOUNTER (OUTPATIENT)
Dept: PHYSICAL THERAPY | Age: 75
Setting detail: RECURRING SERIES
Discharge: HOME | End: 2025-03-23
Attending: ORTHOPAEDIC SURGERY
Payer: MEDICARE

## 2025-03-20 PROCEDURE — 97110 THERAPEUTIC EXERCISES: CPT

## 2025-03-20 NOTE — PROGRESS NOTES
Brent Lamas Jr.  : 1950  Primary: Medicare Part A And B (Medicare)  Secondary: [facility] [place]  [address]  [facility] [address]  Phone: [phone]  Fax: [phone] Plan Frequency: 1-2 visits per week    Plan of Care/Certification Expiration Date: 25        Plan of Care/Certification Expiration Date:  Plan of Care/Certification Expiration Date: 25    Frequency/Duration: Plan Frequency: 1-2 visits per week      Time In/Out:   Time In: 0900  Time Out: 0950      PT Visit Info:    Progress Note Counter: 6      Visit Count:  6    OUTPATIENT PHYSICAL THERAPY:   Treatment Note 3/20/2025       Episode  (R TKA outpatient)               Treatment Diagnosis:    Stiffness of right knee, not elsewhere classified M25.661  Right knee pain, unspecified chronicity M25.561  S/P total knee arthroplasty, right Z96.651  Medical/Referring Diagnosis:    S/P total knee arthroplasty, right [Z96.651]    Referring Physician:  Kenny Diana MD MD Orders:  PT Eval and Treat   Return MD Appt:  25   Date of Onset:  25   Allergies:   Patient has no known allergies.  Restrictions/Precautions:   None      Interventions Planned (Treatment may consist of any combination of the following):     See Assessment Note    Subjective Comments:   States that he has some stiffness to L knee today but not much pain.   Initial Pain Level:   0   /10  Post Session Pain Level:   0   /10  Medications Last Reviewed: 3/20/2025  Updated Objective Findings:  Minimally antalgic gait pattern   Treatment     THERAPEUTIC EXERCISE: (50 minutes):    Exercises per grid below to improve mobility, strength, and balance.  Required moderate visual, verbal, manual, and tactile cues to promote proper body alignment, promote proper body posture, and promote proper body mechanics.  Progressed resistance, range, and repetitions as indicated.   Date:  3/13/25 Date:  3/18/25

## 2025-03-25 ENCOUNTER — HOSPITAL ENCOUNTER (OUTPATIENT)
Dept: PHYSICAL THERAPY | Age: 75
Setting detail: RECURRING SERIES
Discharge: HOME | End: 2025-03-28
Attending: ORTHOPAEDIC SURGERY
Payer: MEDICARE

## 2025-03-25 PROCEDURE — 97110 THERAPEUTIC EXERCISES: CPT

## 2025-03-25 NOTE — PROGRESS NOTES
promote proper body posture, and promote proper body mechanics.  Progressed resistance, range, and repetitions as indicated.   Date:  3/25/25 Date:  3/18/25 Date:  3/20/25   Activity/Exercise Parameters Parameters Parameters   Quad sets      Straight leg raises      Bridging 3 x 12 B  3 x 10 B   Sit to stand 3 x 10 standard chair w/o hands 3 x 10 from standard chair w/o hands 3 x 10 from standard chair w/o hands   PROM  L LE x 5 minutes L LE x 5 minutes    Short arc quads 7.5# 3 x 12 R 7.5# 3 x 12 R 10# 3 x 10 R   Nu Step 8 min, L3 for endurance  10 min, L3 for endurance  --   TKE Blue heavy band  3 x 10, 5\" hold R Blue heavy band  3 x 10, 5\" hold R    Shuttle press Bilateral  125# 3 x 10    Unilateral  50# 3 x 10 R Bilateral  125# 1 x 10  150# 2 x 10    Unilateral  62# 3 x 10 R Bilateral  150# 3 x 10     Unilateral  62# 3 x 10 R   Step ups 8\" 3 x 10 R 8\" 3 x 10 R 8\" 3  x10 R   Squats TRX  3 x 10      Calf stretch Slantboard  3 x 30\" B Slantboard  3 x 30\" B Slantboard  3 x 30\" B   Airdyne   X 9 minutes for motion       Time spent with patient reviewing proper muscle recruitment and technique with exercises.     MANUAL THERAPY: (0 minutes):   Joint mobilization and Soft tissue mobilization was utilized and necessary because of the patient's restricted joint motion.   Grade 3-4 tibiofemoral joint mobilizations for increased R knee extension    MODALITIES: (0 minutes):        None today      HEP: As above; handouts given to patient for all exercises.    Treatment/Session Summary:    Treatment Assessment:   Continues to demonstrate improving R knee motion and R LE strength with minimal pain.  Communication/Consultation:  None today  Equipment provided today:  HEP  Recommendations/Intent for next treatment session: Next visit will focus on improving R LE strength, gait performance and R knee ROM.    >Total Treatment Billable Duration:  53 minutes  Time In: 1330  Time Out: 9328     ALEJANDRO ALANIS, PT         [place]ge

## 2025-03-27 ENCOUNTER — HOSPITAL ENCOUNTER (OUTPATIENT)
Dept: PHYSICAL THERAPY | Age: 75
Setting detail: RECURRING SERIES
Discharge: HOME | End: 2025-03-30
Attending: ORTHOPAEDIC SURGERY
Payer: MEDICARE

## 2025-03-27 PROCEDURE — 97110 THERAPEUTIC EXERCISES: CPT

## 2025-03-27 NOTE — PROGRESS NOTES
Brent Lamas Jr.  : 1950  Primary: Medicare Part A And B (Medicare)  Secondary: [facility] [place]  [address]  [facility] [address]  Phone: [phone]  Fax: [phone] Plan Frequency: 1-2 visits per week    Plan of Care/Certification Expiration Date: 25        Plan of Care/Certification Expiration Date:  Plan of Care/Certification Expiration Date: 25    Frequency/Duration: Plan Frequency: 1-2 visits per week        PT Visit Info:    Progress Note Counter: 0      Visit Count:  8                OUTPATIENT PHYSICAL THERAPY:             Progress Report 3/27/2025               Episode (R TKA outpatient)         Treatment Diagnosis:     Stiffness of right knee, not elsewhere classified M25.661  Right knee pain, unspecified chronicity M25.561  S/P total knee arthroplasty, right Z96.651  Medical/Referring Diagnosis:    S/P total knee arthroplasty, right [Z96.651]    Referring Physician:  Kenny Diana MD MD Orders:  PT Eval and Treat   Return MD Appt:  TBD  Date of Onset:  Onset Date: 25     Allergies:  Patient has no known allergies.  Restrictions/Precautions:    None      Medications Last Reviewed: 3/27/2025        OBJECTIVE     Patient denies any LE paresthesia. Patient denies any increase of symptoms with cough, sneeze or valsalva. Patient denies any saddle paresthesia or bowel/bladder deficits.     Observation/Orthostatic Postural Assessment:          Ambulates into therapy with single point cane with antalgic gait pattern. Incision to R knee is healed with mild scabbing present.       ROM:            AROM/ PROM Left (degrees) Right (degrees)   Knee Flexion 130 127   Knee Extension 0 0   Ankle Dorsiflexion (DF) -   knee extended 15 15     Strength:            Motion Tested Left   (*/5) Right  (*/5)   Knee Extension 5 5 (from 4+)   Knee Flexion 5 5 (from 4+)   Hip Flexion 5 5 (from 4+)   Hip Extension 5 5

## 2025-03-27 NOTE — PROGRESS NOTES
Brent Lamas Jr.  : 1950  Primary: Medicare Part A And B (Medicare)  [facility] [place]  [address]  [facility] [address]  Phone: [phone]  Fax: [phone] Plan Frequency: 1-2 visits per week    Plan of Care/Certification Expiration Date: 25        Plan of Care/Certification Expiration Date:  Plan of Care/Certification Expiration Date: 25    Frequency/Duration: Plan Frequency: 1-2 visits per week      Time In/Out:   Time In: 0900  Time Out: 0946      PT Visit Info:    Progress Note Counter: 0      Visit Count:  8    OUTPATIENT PHYSICAL THERAPY:   Treatment Note 3/27/2025       Episode  (R TKA outpatient)               Treatment Diagnosis:    Stiffness of right knee, not elsewhere classified M25.661  Right knee pain, unspecified chronicity M25.561  S/P total knee arthroplasty, right Z96.651  Medical/Referring Diagnosis:    S/P total knee arthroplasty, right [Z96.651]    Referring Physician:  Kenny Diana MD MD Orders:  PT Eval and Treat   Return MD Appt:  25   Date of Onset:  25   Allergies:   Patient has no known allergies.  Restrictions/Precautions:   None      Interventions Planned (Treatment may consist of any combination of the following):     See Assessment Note    Subjective Comments:   States that he did not sleep well and was unable to play golf as a result. Did go to the [facility] to check out their equipment in plans to begin exercising.   Initial Pain Level:   0   /10  Post Session Pain Level:   0   /10  Medications Last Reviewed: 3/27/2025  Updated Objective Findings:  See Progress Report  Treatment     THERAPEUTIC EXERCISE: (46 minutes):    Exercises per grid below to improve mobility, strength, and balance.  Required moderate visual, verbal, manual, and tactile cues to promote proper body alignment, promote proper body posture, and promote proper body mechanics.  Progressed resistance,

## 2025-04-03 ENCOUNTER — HOSPITAL ENCOUNTER (OUTPATIENT)
Dept: PHYSICAL THERAPY | Age: 75
Setting detail: RECURRING SERIES
Discharge: HOME | End: 2025-04-06
Attending: ORTHOPAEDIC SURGERY
Payer: MEDICARE

## 2025-04-03 PROCEDURE — 97110 THERAPEUTIC EXERCISES: CPT

## 2025-04-03 NOTE — PROGRESS NOTES
Brent Lamas Jr.  : 1950  Primary: Medicare Part A And B (Medicare)  Secondary: [facility] [place]  [address]  [facility] [address]  Phone: [phone]  Fax: [phone] Plan Frequency: 1-2 visits per week    Plan of Care/Certification Expiration Date: 25        Plan of Care/Certification Expiration Date:  Plan of Care/Certification Expiration Date: 25    Frequency/Duration: Plan Frequency: 1-2 visits per week      Time In/Out:   Time In: 1002  Time Out: 1048      PT Visit Info:    Progress Note Counter: 0      Visit Count:  9    OUTPATIENT PHYSICAL THERAPY:   Treatment Note 4/3/2025       Episode  (R TKA outpatient)               Treatment Diagnosis:    Stiffness of right knee, not elsewhere classified M25.661  Right knee pain, unspecified chronicity M25.561  S/P total knee arthroplasty, right Z96.651  Medical/Referring Diagnosis:    S/P total knee arthroplasty, right [Z96.651]    Referring Physician:  Kenny Diana MD MD Orders:  PT Eval and Treat   Return MD Appt:  25   Date of Onset:  25   Allergies:   Patient has no known allergies.  Restrictions/Precautions:   None      Interventions Planned (Treatment may consist of any combination of the following):     See Assessment Note    Subjective Comments:   States that he played 9 holes of golf without any issues. Has some soreness occasionally but overall R knee is doing much better.  Initial Pain Level:   0   /10  Post Session Pain Level:   0   /10  Medications Last Reviewed: 4/3/2025  Updated Objective Findings:  Able to complete all repetitions of sit to stands without use of hands  Treatment     THERAPEUTIC EXERCISE: (46 minutes):    Exercises per grid below to improve mobility, strength, and balance.  Required moderate visual, verbal, manual, and tactile cues to promote proper body alignment, promote proper body posture, and promote proper body

## 2025-04-07 ENCOUNTER — OFFICE VISIT (OUTPATIENT)
Dept: INTERNAL MEDICINE CLINIC | Facility: CLINIC | Age: 75
End: 2025-04-07
Payer: MEDICARE

## 2025-04-07 VITALS
HEIGHT: 72 IN | DIASTOLIC BLOOD PRESSURE: 57 MMHG | RESPIRATION RATE: 16 BRPM | WEIGHT: 199.8 LBS | BODY MASS INDEX: 27.06 KG/M2 | TEMPERATURE: 97.5 F | HEART RATE: 66 BPM | SYSTOLIC BLOOD PRESSURE: 113 MMHG

## 2025-04-07 DIAGNOSIS — R79.89 LOW TESTOSTERONE: ICD-10-CM

## 2025-04-07 DIAGNOSIS — I10 ESSENTIAL HYPERTENSION: Primary | ICD-10-CM

## 2025-04-07 DIAGNOSIS — I10 ESSENTIAL HYPERTENSION: ICD-10-CM

## 2025-04-07 DIAGNOSIS — Z63.4 GRIEF AT LOSS OF CHILD: ICD-10-CM

## 2025-04-07 DIAGNOSIS — F33.2 SEVERE EPISODE OF RECURRENT MAJOR DEPRESSIVE DISORDER, WITHOUT PSYCHOTIC FEATURES (HCC): ICD-10-CM

## 2025-04-07 DIAGNOSIS — G47.33 OSA (OBSTRUCTIVE SLEEP APNEA): ICD-10-CM

## 2025-04-07 DIAGNOSIS — F43.21 GRIEF AT LOSS OF CHILD: ICD-10-CM

## 2025-04-07 PROBLEM — F32.1 CURRENT MODERATE EPISODE OF MAJOR DEPRESSIVE DISORDER WITHOUT PRIOR EPISODE (HCC): Status: ACTIVE | Noted: 2025-04-07

## 2025-04-07 LAB
ALBUMIN SERPL-MCNC: 4 G/DL (ref 3.2–4.6)
ALBUMIN/GLOB SERPL: 1.3 (ref 1–1.9)
ALP SERPL-CCNC: 93 U/L (ref 40–129)
ALT SERPL-CCNC: 17 U/L (ref 8–55)
ANION GAP SERPL CALC-SCNC: 10 MMOL/L (ref 7–16)
AST SERPL-CCNC: 18 U/L (ref 15–37)
BASOPHILS # BLD: 0.06 K/UL (ref 0–0.2)
BASOPHILS NFR BLD: 1.1 % (ref 0–2)
BILIRUB SERPL-MCNC: 0.7 MG/DL (ref 0–1.2)
BUN SERPL-MCNC: 17 MG/DL (ref 8–23)
CALCIUM SERPL-MCNC: 10.1 MG/DL (ref 8.8–10.2)
CHLORIDE SERPL-SCNC: 104 MMOL/L (ref 98–107)
CHOLEST SERPL-MCNC: 174 MG/DL (ref 0–200)
CO2 SERPL-SCNC: 25 MMOL/L (ref 20–29)
CREAT SERPL-MCNC: 1.02 MG/DL (ref 0.8–1.3)
DIFFERENTIAL METHOD BLD: ABNORMAL
EOSINOPHIL # BLD: 0.1 K/UL (ref 0–0.8)
EOSINOPHIL NFR BLD: 1.8 % (ref 0.5–7.8)
ERYTHROCYTE [DISTWIDTH] IN BLOOD BY AUTOMATED COUNT: 15 % (ref 11.9–14.6)
GLOBULIN SER CALC-MCNC: 3.2 G/DL (ref 2.3–3.5)
GLUCOSE SERPL-MCNC: 93 MG/DL (ref 70–99)
HCT VFR BLD AUTO: 42.6 % (ref 41.1–50.3)
HDLC SERPL-MCNC: 43 MG/DL (ref 40–60)
HDLC SERPL: 4.1 (ref 0–5)
HGB BLD-MCNC: 13.8 G/DL (ref 13.6–17.2)
IMM GRANULOCYTES # BLD AUTO: 0.01 K/UL (ref 0–0.5)
IMM GRANULOCYTES NFR BLD AUTO: 0.2 % (ref 0–5)
LDLC SERPL CALC-MCNC: 87 MG/DL (ref 0–100)
LDLC SERPL DIRECT ASSAY-MCNC: 102 MG/DL (ref 0–100)
LYMPHOCYTES # BLD: 1.62 K/UL (ref 0.5–4.6)
LYMPHOCYTES NFR BLD: 28.5 % (ref 13–44)
MCH RBC QN AUTO: 28 PG (ref 26.1–32.9)
MCHC RBC AUTO-ENTMCNC: 32.4 G/DL (ref 31.4–35)
MCV RBC AUTO: 86.6 FL (ref 82–102)
MONOCYTES # BLD: 0.46 K/UL (ref 0.1–1.3)
MONOCYTES NFR BLD: 8.1 % (ref 4–12)
NEUTS SEG # BLD: 3.44 K/UL (ref 1.7–8.2)
NEUTS SEG NFR BLD: 60.3 % (ref 43–78)
NRBC # BLD: 0 K/UL (ref 0–0.2)
PLATELET # BLD AUTO: 229 K/UL (ref 150–450)
PMV BLD AUTO: 11.8 FL (ref 9.4–12.3)
POTASSIUM SERPL-SCNC: 4.5 MMOL/L (ref 3.5–5.1)
PROT SERPL-MCNC: 7.2 G/DL (ref 6.3–8.2)
RBC # BLD AUTO: 4.92 M/UL (ref 4.23–5.6)
SODIUM SERPL-SCNC: 138 MMOL/L (ref 136–145)
TRIGL SERPL-MCNC: 223 MG/DL (ref 0–150)
TSH W FREE THYROID IF ABNORMAL: 2.99 UIU/ML (ref 0.27–4.2)
VLDLC SERPL CALC-MCNC: 45 MG/DL (ref 6–23)
WBC # BLD AUTO: 5.7 K/UL (ref 4.3–11.1)

## 2025-04-07 PROCEDURE — 1123F ACP DISCUSS/DSCN MKR DOCD: CPT | Performed by: INTERNAL MEDICINE

## 2025-04-07 PROCEDURE — 3017F COLORECTAL CA SCREEN DOC REV: CPT | Performed by: INTERNAL MEDICINE

## 2025-04-07 PROCEDURE — G2211 COMPLEX E/M VISIT ADD ON: HCPCS | Performed by: INTERNAL MEDICINE

## 2025-04-07 PROCEDURE — 1036F TOBACCO NON-USER: CPT | Performed by: INTERNAL MEDICINE

## 2025-04-07 PROCEDURE — 1126F AMNT PAIN NOTED NONE PRSNT: CPT | Performed by: INTERNAL MEDICINE

## 2025-04-07 PROCEDURE — G8417 CALC BMI ABV UP PARAM F/U: HCPCS | Performed by: INTERNAL MEDICINE

## 2025-04-07 PROCEDURE — 3078F DIAST BP <80 MM HG: CPT | Performed by: INTERNAL MEDICINE

## 2025-04-07 PROCEDURE — 99214 OFFICE O/P EST MOD 30 MIN: CPT | Performed by: INTERNAL MEDICINE

## 2025-04-07 PROCEDURE — 3074F SYST BP LT 130 MM HG: CPT | Performed by: INTERNAL MEDICINE

## 2025-04-07 PROCEDURE — G8428 CUR MEDS NOT DOCUMENT: HCPCS | Performed by: INTERNAL MEDICINE

## 2025-04-07 RX ORDER — SERTRALINE HYDROCHLORIDE 100 MG/1
100 TABLET, FILM COATED ORAL DAILY
Qty: 90 TABLET | Refills: 3 | Status: SHIPPED | OUTPATIENT
Start: 2025-04-07 | End: 2026-04-02

## 2025-04-07 RX ORDER — TESTOSTERONE 1.62 MG/G
GEL TRANSDERMAL
Qty: 75 G | Refills: 5 | Status: SHIPPED | OUTPATIENT
Start: 2025-04-07 | End: 2025-10-07

## 2025-04-07 SDOH — ECONOMIC STABILITY: FOOD INSECURITY: WITHIN THE PAST 12 MONTHS, THE FOOD YOU BOUGHT JUST DIDN'T LAST AND YOU DIDN'T HAVE MONEY TO GET MORE.: NEVER TRUE

## 2025-04-07 SDOH — ECONOMIC STABILITY: FOOD INSECURITY: WITHIN THE PAST 12 MONTHS, YOU WORRIED THAT YOUR FOOD WOULD RUN OUT BEFORE YOU GOT MONEY TO BUY MORE.: NEVER TRUE

## 2025-04-07 SDOH — SOCIAL STABILITY - SOCIAL INSECURITY: DISSAPEARANCE AND DEATH OF FAMILY MEMBER: Z63.4

## 2025-04-07 ASSESSMENT — PATIENT HEALTH QUESTIONNAIRE - PHQ9
1. LITTLE INTEREST OR PLEASURE IN DOING THINGS: SEVERAL DAYS
3. TROUBLE FALLING OR STAYING ASLEEP: SEVERAL DAYS
SUM OF ALL RESPONSES TO PHQ QUESTIONS 1-9: 6
SUM OF ALL RESPONSES TO PHQ QUESTIONS 1-9: 6
10. IF YOU CHECKED OFF ANY PROBLEMS, HOW DIFFICULT HAVE THESE PROBLEMS MADE IT FOR YOU TO DO YOUR WORK, TAKE CARE OF THINGS AT HOME, OR GET ALONG WITH OTHER PEOPLE: SOMEWHAT DIFFICULT
7. TROUBLE CONCENTRATING ON THINGS, SUCH AS READING THE NEWSPAPER OR WATCHING TELEVISION: NOT AT ALL
2. FEELING DOWN, DEPRESSED OR HOPELESS: SEVERAL DAYS
8. MOVING OR SPEAKING SO SLOWLY THAT OTHER PEOPLE COULD HAVE NOTICED. OR THE OPPOSITE, BEING SO FIGETY OR RESTLESS THAT YOU HAVE BEEN MOVING AROUND A LOT MORE THAN USUAL: NOT AT ALL
5. POOR APPETITE OR OVEREATING: SEVERAL DAYS
4. FEELING TIRED OR HAVING LITTLE ENERGY: SEVERAL DAYS
SUM OF ALL RESPONSES TO PHQ QUESTIONS 1-9: 6
SUM OF ALL RESPONSES TO PHQ QUESTIONS 1-9: 6
9. THOUGHTS THAT YOU WOULD BE BETTER OFF DEAD, OR OF HURTING YOURSELF: NOT AT ALL
6. FEELING BAD ABOUT YOURSELF - OR THAT YOU ARE A FAILURE OR HAVE LET YOURSELF OR YOUR FAMILY DOWN: SEVERAL DAYS

## 2025-04-07 NOTE — PROGRESS NOTES
Neurological:      General: No focal deficit present.      Mental Status: He is alert.   Psychiatric:         Attention and Perception: Attention normal.         Mood and Affect: Mood is depressed. Affect is tearful.         Speech: Speech normal.         Behavior: Behavior is cooperative.         Cognition and Memory: Cognition and memory normal.             Assessment & Plan           Encounter Diagnoses   Name Primary?    Essential hypertension Yes    Low testosterone     Severe episode of recurrent major depressive disorder, without psychotic features (HCC)     DINESH (obstructive sleep apnea)     Grief at loss of child        Orders Placed This Encounter   Medications    Testosterone (ANDROGEL) 20.25 MG/ACT (1.62%) GEL gel     Sig: APPLY 2 DEPRESSIONS TO UPPER ARM IN THE MORNING BEFORE 9 AM     Dispense:  75 g     Refill:  5    sertraline (ZOLOFT) 100 MG tablet     Sig: Take 1 tablet by mouth daily     Dispense:  90 tablet     Refill:  3       Orders Placed This Encounter   Procedures    Comprehensive Metabolic Panel     Standing Status:   Future     Number of Occurrences:   1     Expected Date:   4/7/2025     Expiration Date:   4/7/2026    CBC with Auto Differential     Standing Status:   Future     Number of Occurrences:   1     Expected Date:   4/7/2025     Expiration Date:   4/7/2026    Lipid Panel     Standing Status:   Future     Number of Occurrences:   1     Expected Date:   4/7/2025     Expiration Date:   4/7/2026    TSH reflex to FT4     Standing Status:   Future     Number of Occurrences:   1     Expected Date:   4/7/2025     Expiration Date:   4/7/2026    Testosterone Total Only, Male     Standing Status:   Future     Number of Occurrences:   1     Expected Date:   4/7/2025     Expiration Date:   4/7/2026    LDL Cholesterol, Direct     Standing Status:   Future     Number of Occurrences:   1     Expected Date:   4/7/2025     Expiration Date:   4/7/2026       Continues with significant grief,

## 2025-04-08 ENCOUNTER — HOSPITAL ENCOUNTER (OUTPATIENT)
Dept: PHYSICAL THERAPY | Age: 75
Setting detail: RECURRING SERIES
Discharge: HOME | End: 2025-04-11
Attending: ORTHOPAEDIC SURGERY
Payer: MEDICARE

## 2025-04-08 LAB — TESTOST SERPL-MCNC: 446 NG/DL (ref 264–916)

## 2025-04-08 PROCEDURE — 97110 THERAPEUTIC EXERCISES: CPT

## 2025-04-08 NOTE — PROGRESS NOTES
Date:  3/27/25 Date:  4/3/25   Activity/Exercise Parameters Parameters Parameters   Quad sets      Straight leg raises      Bridging  3 x 10 B 3 x 10 B   Sit to stand 3 x 10 standard chair w/o hands 3 x 10 standard chair w/o hands 3 x 10 from standard chair w/o hands   PROM  L LE x 5 minutes     Short arc quads 14# 3 x 10 R 10# 3 x 12 R 10# 3 x 12 R   Nu Step 5 min, L3 for endurance      TKE      Shuttle press Bilateral  137# 1 x 12  150#     Unilateral  75# 3 x 10 R Bilateral  150# 3 x 10    Unilateral  62# 3 x 10 R Bilateral  150# 3 x 10    Unilateral  75# 3 x 10 R   Step ups 4\" stepdown  3 x 10 on R 8\" 3 x 10 R 8\" 4 x 10 R    4\" stepdown  3 x 10 on R   Squats      Calf stretch Slantboard  3 x 30\" B Slantboard  3 x 30\" B Slantboard  3 x 30\" B   Airdyne   --   Knee extension machine Bilateral  33# 3 x 12 B  Bilateral  33# 3 x 12              Time spent with patient reviewing proper muscle recruitment and technique with exercises.     MANUAL THERAPY: (0 minutes):   Joint mobilization and Soft tissue mobilization was utilized and necessary because of the patient's restricted joint motion.   Grade 3-4 tibiofemoral joint mobilizations for increased R knee extension    MODALITIES: (0 minutes):        None today      HEP: As above; handouts given to patient for all exercises.    Treatment/Session Summary:    Treatment Assessment:   Continues to make good progress with both strength, gait performance and overall motion. Is appropriate for discharge on 4/17/25.  Communication/Consultation:  None today  Equipment provided today:  None  Recommendations/Intent for next treatment session: Next visit will focus on improving R LE strength, gait performance and R knee ROM. Will reduce frequency to 1 visit per week with plans to discharge on 4/17/25.    >Total Treatment Billable Duration:  46 minutes  Time In: 1330  Time Out: 1416     ALEJANDRO ALANIS PT         Charge Capture  Events  [employer] Portal  Appt Desk  Attendance

## 2025-04-10 ENCOUNTER — APPOINTMENT (OUTPATIENT)
Dept: PHYSICAL THERAPY | Age: 75
End: 2025-04-10
Attending: ORTHOPAEDIC SURGERY
Payer: MEDICARE

## 2025-04-10 ENCOUNTER — RESULTS FOLLOW-UP (OUTPATIENT)
Dept: INTERNAL MEDICINE CLINIC | Facility: CLINIC | Age: 75
End: 2025-04-10

## 2025-04-14 ENCOUNTER — APPOINTMENT (OUTPATIENT)
Dept: PHYSICAL THERAPY | Age: 75
End: 2025-04-14
Attending: ORTHOPAEDIC SURGERY
Payer: MEDICARE

## 2025-04-17 ENCOUNTER — HOSPITAL ENCOUNTER (OUTPATIENT)
Dept: PHYSICAL THERAPY | Age: 75
Setting detail: RECURRING SERIES
Discharge: HOME | End: 2025-04-20
Attending: ORTHOPAEDIC SURGERY
Payer: MEDICARE

## 2025-04-17 PROCEDURE — 97110 THERAPEUTIC EXERCISES: CPT

## 2025-04-17 NOTE — PROGRESS NOTES
Brent Lamas Jr.  : 1950  Primary: Medicare Part A And B (Medicare)  Secondary: [facility] [place]  [address] A  [facility] [address]  Phone: [phone]  Fax: [phone] Plan Frequency: 1-2 visits per week    Plan of Care/Certification Expiration Date: 25        Plan of Care/Certification Expiration Date:  Plan of Care/Certification Expiration Date: 25    Frequency/Duration: Plan Frequency: 1-2 visits per week        PT Visit Info:    Progress Note Counter: 1      Visit Count:  11                OUTPATIENT PHYSICAL THERAPY:             Progress Report 2025               Episode (R TKA outpatient)         Treatment Diagnosis:     Stiffness of right knee, not elsewhere classified M25.661  Right knee pain, unspecified chronicity M25.561  S/P total knee arthroplasty, right Z96.651  Medical/Referring Diagnosis:    S/P total knee arthroplasty, right [Z96.651]    Referring Physician:  Kenny Diana MD MD Orders:  PT Eval and Treat   Return MD Appt:  TBD  Date of Onset:  Onset Date: 25     Allergies:  Patient has no known allergies.  Restrictions/Precautions:    None      Medications Last Reviewed: 2025        OBJECTIVE     Patient denies any LE paresthesia. Patient denies any increase of symptoms with cough, sneeze or valsalva. Patient denies any saddle paresthesia or bowel/bladder deficits.     Observation/Orthostatic Postural Assessment:          Ambulates into therapy with single point cane with antalgic gait pattern. Incision to R knee is healed with mild scabbing present.       ROM:            AROM/ PROM Left (degrees) Right (degrees)   Knee Flexion 130 127   Knee Extension 0 0   Ankle Dorsiflexion (DF) -   knee extended 15 15     Strength:            Motion Tested Left   (*/5) Right  (*/5)   Knee Extension 5 5 (from 4+)   Knee Flexion 5 5 (from 4+)   Hip Flexion 5 5 (from 4+)   Hip Extension 5 5

## 2025-04-17 NOTE — PROGRESS NOTES
Brent Lamas Jr.  : 1950  Primary: Medicare Part A [place] B (Medicare)  Secondary: [facility] [place]  [address]her MORIN A  [facility] [address]  Phone: [phone]  Fax: [phone] Plan Frequency: 1-2 visits per week    Plan of Care/Certification Expiration Date: 25        Plan of Care/Certification Expiration Date:  Plan of Care/Certification Expiration Date: 25    Frequency/Duration: Plan Frequency: 1-2 visits per week      Time In/Out:   Time In: 1330  Time Out: 1400      PT Visit Info:    Progress Note Counter: 2      Visit Count:  11    OUTPATIENT PHYSICAL THERAPY:   Treatment Note 2025       Episode  (R TKA outpatient)               Treatment Diagnosis:    Stiffness of right knee, not elsewhere classified M25.661  Right knee pain, unspecified chronicity M25.561  S/P total knee arthroplasty, right Z96.651  Medical/Referring Diagnosis:    S/P total knee arthroplasty, right [Z96.651]    Referring Physician:  Kenny Diana MD MD Orders:  PT Eval and Treat   Return MD Appt:  25   Date of Onset:  25   Allergies:   Patient has no known allergies.  Restrictions/Precautions:   None      Interventions Planned (Treatment may consist of any combination of the following):     See Assessment Note    Subjective Comments:   States that he has played golf a few times without R knee pain. Has some soreness on again and off again.   Initial Pain Level:   0   /10  Post Session Pain Level:   0   /10  Medications Last Reviewed: 2025  Updated Objective Findings:  See Discharge Summary  Treatment     THERAPEUTIC EXERCISE: (30 minutes):    Exercises per grid below to improve mobility, strength, and balance.  Required moderate visual, verbal, manual, and tactile cues to promote proper body alignment, promote proper body posture, and promote proper body mechanics.  Progressed resistance, range, and repetitions as indicated.

## 2025-05-19 ENCOUNTER — TELEPHONE (OUTPATIENT)
Dept: ORTHOPEDIC SURGERY | Age: 75
End: 2025-05-19

## 2025-05-19 NOTE — TELEPHONE ENCOUNTER
Left vm to see if patient wanted to come in on Wednesday at 2:40 to get xrays. Patient will need to call back to confirm this appt. We will be at  that day  **APPTS: If patient calls back please schedule him for 5/21 at 2:40 PM with VENUS

## 2025-05-19 NOTE — TELEPHONE ENCOUNTER
He had knee surgery not too long ago and fell over the weekend on both of his knees. Please call to let him know if he needs to be seen.

## 2025-05-21 ENCOUNTER — OFFICE VISIT (OUTPATIENT)
Dept: ORTHOPEDIC SURGERY | Age: 75
End: 2025-05-21
Payer: MEDICARE

## 2025-05-21 DIAGNOSIS — S80.01XA CONTUSION OF RIGHT KNEE, INITIAL ENCOUNTER: ICD-10-CM

## 2025-05-21 DIAGNOSIS — M25.561 ACUTE PAIN OF RIGHT KNEE: ICD-10-CM

## 2025-05-21 DIAGNOSIS — Z96.651 S/P TOTAL KNEE ARTHROPLASTY, RIGHT: Primary | ICD-10-CM

## 2025-05-21 PROCEDURE — G8417 CALC BMI ABV UP PARAM F/U: HCPCS | Performed by: PHYSICIAN ASSISTANT

## 2025-05-21 PROCEDURE — 1036F TOBACCO NON-USER: CPT | Performed by: PHYSICIAN ASSISTANT

## 2025-05-21 PROCEDURE — G8428 CUR MEDS NOT DOCUMENT: HCPCS | Performed by: PHYSICIAN ASSISTANT

## 2025-05-21 PROCEDURE — 1123F ACP DISCUSS/DSCN MKR DOCD: CPT | Performed by: PHYSICIAN ASSISTANT

## 2025-05-21 PROCEDURE — 99214 OFFICE O/P EST MOD 30 MIN: CPT | Performed by: PHYSICIAN ASSISTANT

## 2025-05-21 PROCEDURE — 3017F COLORECTAL CA SCREEN DOC REV: CPT | Performed by: PHYSICIAN ASSISTANT

## 2025-05-21 NOTE — PROGRESS NOTES
2mm of medial/lateral instability bilaterally.  There is 2 degrees of varus alignment in the right knee.  There is some pain to palpation over the medial joint line.  Limb lengths are equal.  Patient ambulates with normal reciprocal gait  Straight leg test is negative.  Quadriceps strength is good.  Sensation is intact to light touch bilaterally.  Their judgment and insight are normal.  They are oriented to time, place and person.  Their memory is good and the mood and affect appropriate.    X-RAY: Views of the right knee are reviewed.  3 views standing reveal good bone prosthetic interface with no suggestion of progressive lucency.      X-ray impression:  Stable right total knee arthroplasty.    IMPRESSION:    Diagnosis Orders   1. S/P total knee arthroplasty, right  XR KNEE RIGHT (3 VIEWS)      2. Acute pain of right knee        3. Contusion of right knee, initial encounter            RECOMMENDATIONS:    Reviewed x-ray findings with the patient.  He was assured that his right TKA appears both clinically and radiographically stable and was not compromised by his fall.  He was informed that his symptoms resulting from right knee contusion should be self-limiting and he will continue supportive care including Tylenol, ibuprofen, elevation, and cold compress as needed.  He may gradually progress activity as tolerated and will follow-up for his routine 6-month recheck in 2 months or sooner with any concerns.      Approximately 30 minutes was spent reviewing the medical record, imaging, performing history and physical examination, discussing the diagnosis and treatment plan with the patient, and completing documentation for this visit.

## 2025-07-04 ASSESSMENT — SLEEP AND FATIGUE QUESTIONNAIRES
HOW LIKELY ARE YOU TO NOD OFF OR FALL ASLEEP WHILE SITTING INACTIVE IN A PUBLIC PLACE: SLIGHT CHANCE OF DOZING
HOW LIKELY ARE YOU TO NOD OFF OR FALL ASLEEP WHILE SITTING QUIETLY AFTER LUNCH WITHOUT ALCOHOL: SLIGHT CHANCE OF DOZING
HOW LIKELY ARE YOU TO NOD OFF OR FALL ASLEEP WHILE WATCHING TV: SLIGHT CHANCE OF DOZING
HOW LIKELY ARE YOU TO NOD OFF OR FALL ASLEEP WHILE SITTING QUIETLY AFTER LUNCH WITHOUT ALCOHOL: SLIGHT CHANCE OF DOZING
HOW LIKELY ARE YOU TO NOD OFF OR FALL ASLEEP WHEN YOU ARE A PASSENGER IN A CAR FOR AN HOUR WITHOUT A BREAK: SLIGHT CHANCE OF DOZING
HOW LIKELY ARE YOU TO NOD OFF OR FALL ASLEEP WHILE SITTING AND READING: SLIGHT CHANCE OF DOZING
HOW LIKELY ARE YOU TO NOD OFF OR FALL ASLEEP WHILE WATCHING TV: SLIGHT CHANCE OF DOZING
HOW LIKELY ARE YOU TO NOD OFF OR FALL ASLEEP IN A CAR, WHILE STOPPED FOR A FEW MINUTES IN TRAFFIC: WOULD NEVER DOZE
HOW LIKELY ARE YOU TO NOD OFF OR FALL ASLEEP WHILE LYING DOWN TO REST IN THE AFTERNOON WHEN CIRCUMSTANCES PERMIT: MODERATE CHANCE OF DOZING
HOW LIKELY ARE YOU TO NOD OFF OR FALL ASLEEP WHILE SITTING AND TALKING TO SOMEONE: WOULD NEVER DOZE
HOW LIKELY ARE YOU TO NOD OFF OR FALL ASLEEP WHEN YOU ARE A PASSENGER IN A CAR FOR AN HOUR WITHOUT A BREAK: SLIGHT CHANCE OF DOZING
HOW LIKELY ARE YOU TO NOD OFF OR FALL ASLEEP WHILE LYING DOWN TO REST IN THE AFTERNOON WHEN CIRCUMSTANCES PERMIT: MODERATE CHANCE OF DOZING
HOW LIKELY ARE YOU TO NOD OFF OR FALL ASLEEP WHILE SITTING AND TALKING TO SOMEONE: WOULD NEVER DOZE
ESS TOTAL SCORE: 7
HOW LIKELY ARE YOU TO NOD OFF OR FALL ASLEEP WHILE SITTING AND READING: SLIGHT CHANCE OF DOZING
HOW LIKELY ARE YOU TO NOD OFF OR FALL ASLEEP IN A CAR, WHILE STOPPED FOR A FEW MINUTES IN TRAFFIC: WOULD NEVER DOZE
HOW LIKELY ARE YOU TO NOD OFF OR FALL ASLEEP WHILE SITTING INACTIVE IN A PUBLIC PLACE: SLIGHT CHANCE OF DOZING

## 2025-07-07 ENCOUNTER — OFFICE VISIT (OUTPATIENT)
Dept: SLEEP MEDICINE | Age: 75
End: 2025-07-07
Payer: MEDICARE

## 2025-07-07 VITALS
BODY MASS INDEX: 27.04 KG/M2 | HEART RATE: 71 BPM | WEIGHT: 204 LBS | OXYGEN SATURATION: 93 % | DIASTOLIC BLOOD PRESSURE: 69 MMHG | HEIGHT: 73 IN | SYSTOLIC BLOOD PRESSURE: 107 MMHG

## 2025-07-07 DIAGNOSIS — R79.0 LOW FERRITIN: ICD-10-CM

## 2025-07-07 DIAGNOSIS — G47.33 OSA (OBSTRUCTIVE SLEEP APNEA): Primary | ICD-10-CM

## 2025-07-07 DIAGNOSIS — G25.81 RESTLESS LEGS SYNDROME (RLS): ICD-10-CM

## 2025-07-07 DIAGNOSIS — G47.10 HYPERSOMNOLENCE: ICD-10-CM

## 2025-07-07 LAB
FERRITIN SERPL-MCNC: 78 NG/ML (ref 8–388)
IRON SATN MFR SERPL: 21 % (ref 20–50)
IRON SERPL-MCNC: 70 UG/DL (ref 35–100)
TIBC SERPL-MCNC: 340 UG/DL (ref 240–450)
UIBC SERPL-MCNC: 270 UG/DL (ref 112–347)

## 2025-07-07 PROCEDURE — G2211 COMPLEX E/M VISIT ADD ON: HCPCS | Performed by: NURSE PRACTITIONER

## 2025-07-07 PROCEDURE — G8417 CALC BMI ABV UP PARAM F/U: HCPCS | Performed by: NURSE PRACTITIONER

## 2025-07-07 PROCEDURE — 1123F ACP DISCUSS/DSCN MKR DOCD: CPT | Performed by: NURSE PRACTITIONER

## 2025-07-07 PROCEDURE — 3017F COLORECTAL CA SCREEN DOC REV: CPT | Performed by: NURSE PRACTITIONER

## 2025-07-07 PROCEDURE — G8427 DOCREV CUR MEDS BY ELIG CLIN: HCPCS | Performed by: NURSE PRACTITIONER

## 2025-07-07 PROCEDURE — 3078F DIAST BP <80 MM HG: CPT | Performed by: NURSE PRACTITIONER

## 2025-07-07 PROCEDURE — 99213 OFFICE O/P EST LOW 20 MIN: CPT | Performed by: NURSE PRACTITIONER

## 2025-07-07 PROCEDURE — 3074F SYST BP LT 130 MM HG: CPT | Performed by: NURSE PRACTITIONER

## 2025-07-07 PROCEDURE — 1036F TOBACCO NON-USER: CPT | Performed by: NURSE PRACTITIONER

## 2025-07-07 PROCEDURE — 1159F MED LIST DOCD IN RCRD: CPT | Performed by: NURSE PRACTITIONER

## 2025-07-07 RX ORDER — MAGNESIUM GLUCONATE 27 MG(500)
300 TABLET ORAL 2 TIMES DAILY
COMMUNITY

## 2025-07-07 ASSESSMENT — SLEEP AND FATIGUE QUESTIONNAIRES
HOW LIKELY ARE YOU TO NOD OFF OR FALL ASLEEP WHILE SITTING AND TALKING TO SOMEONE: WOULD NEVER DOZE
HOW LIKELY ARE YOU TO NOD OFF OR FALL ASLEEP WHILE SITTING INACTIVE IN A PUBLIC PLACE: SLIGHT CHANCE OF DOZING
HOW LIKELY ARE YOU TO NOD OFF OR FALL ASLEEP WHILE SITTING AND READING: SLIGHT CHANCE OF DOZING
HOW LIKELY ARE YOU TO NOD OFF OR FALL ASLEEP WHEN YOU ARE A PASSENGER IN A CAR FOR AN HOUR WITHOUT A BREAK: SLIGHT CHANCE OF DOZING
HOW LIKELY ARE YOU TO NOD OFF OR FALL ASLEEP WHILE SITTING QUIETLY AFTER LUNCH WITHOUT ALCOHOL: SLIGHT CHANCE OF DOZING
HOW LIKELY ARE YOU TO NOD OFF OR FALL ASLEEP IN A CAR, WHILE STOPPED FOR A FEW MINUTES IN TRAFFIC: WOULD NEVER DOZE
HOW LIKELY ARE YOU TO NOD OFF OR FALL ASLEEP WHILE WATCHING TV: SLIGHT CHANCE OF DOZING
ESS TOTAL SCORE: 7
HOW LIKELY ARE YOU TO NOD OFF OR FALL ASLEEP WHILE LYING DOWN TO REST IN THE AFTERNOON WHEN CIRCUMSTANCES PERMIT: MODERATE CHANCE OF DOZING

## 2025-07-07 ASSESSMENT — ENCOUNTER SYMPTOMS
APNEA: 0
SORE THROAT: 0
SHORTNESS OF BREATH: 0
VOICE CHANGE: 0

## 2025-07-07 NOTE — PATIENT INSTRUCTIONS
I will call you regarding your ferritin and iron saturation levels as soon as they result.  CPAP supplies reordered through [facility].  Increase usage of CPAP to help support your overall cardiometabolic function  Consider setting a timer to remind yourself to wind down and be in bed no later than 12 midnight nightly  Follow-up in 6 months or sooner as needed         Learning About Sleeping Well  What does sleeping well mean?    Sleeping well means getting enough sleep. How much sleep is enough varies among people.  The number of hours you sleep is not as important as how you feel when you wake up. If you do not feel refreshed, you probably need more sleep. Another sign of not getting enough sleep is feeling tired during the day.  The average total nightly sleep time is 7½ to 8 hours. Healthy adults may need a little more or a little less than this.  Why is getting enough sleep important?  Getting enough quality sleep is a basic part of good health. When your sleep suffers, your mood and your thoughts can suffer too. You may find yourself feeling more grumpy or stressed. Not getting enough sleep also can lead to serious problems, including injury, accidents, anxiety, and depression.  What might cause poor sleeping?  Many things can cause sleep problems, including:  Stress. Stress can be caused by fear about a single event, such as giving a speech. Or you may have ongoing stress, such as worry about work or school.  Depression, anxiety, and other mental or emotional conditions.  Changes in your sleep habits or surroundings. This includes changes that happen where you sleep, such as noise, light, or sleeping in a different bed. It also includes changes in your sleep pattern, such as having jet lag or working a late shift.  Health problems, such as pain, breathing problems, and restless legs syndrome.  Lack of regular exercise.  How can you help yourself?  Here are some tips that may help you sleep more soundly and wake up

## 2025-07-07 NOTE — PROGRESS NOTES
(Medicare)      AMB Supply Order  Order Details     DME Location: [facility]   Order Date: 7/7/2025   The primary encounter diagnosis was DINESH (obstructive sleep apnea). Diagnoses of Restless legs syndrome (RLS), Hypersomnolence, and Low ferritin were also pertinent to this visit.             (  X   )Supplies Needed       APAP Machine   (     ) CPAP Unit  (     ) Auto CPAP Unit  (     ) BiLevel Unit  (     ) Auto BiLevel Unit  (     ) ASV   (     ) Bilevel ST      Length of need: 12 months    Pressure:  9-15 cmH20  EPR: 2     Starting Ramp Pressure:  4 cm H20  Ramp Time: min  15    Patient had a diagnostic Apnea Hypopnea Index (AHI) of :  31.6  *SUPPLIES* Replace all as needed, or per coverage guidelines     Masks Type:  ( x   ) -Full Face Mask (1 per 3 mon)  (  x  ) -Full Mask (1 per month) Interface/Cushion      (  ) -Nasal Mask (1 per 3 mon)  (  ) - Nasal Mask (1 per month) Interface/Cushion  (     ) -Pillow (2 per mon)  (     ) -Wucijyrdo (1 per 6 mon)            Other Supplies:    (   X  )-Goybksbj (1 per 6 mon)  (   X  )-Bwyaym Tubing (1 per 3 mon)  (   X  )- Disposable Filter (2 per mon)  (   x  )-Uayeku Humidifier (1 per year)     ( x    )-Jmavjcihz (sometimes used with Full Face Mask) (1 per 6 mos)  (    )-Tubing-without heat (1 per 3 mos)  (     )-Non-Disposable Filter (1 per 6 mos)  (  x   )-Water Chamber (1 per 6 mos)  (     )-Humidifier non-heated (1 per 5 yrs)      Signed Date: 7/7/2025  Electronically Signed By: LOYD Gruber  Electronically Dated:  7/7/2025         Collaborating Physician: Dr. Childs    Today's office visit was spent  reviewing test results, prognosis, importance of compliance, education about disease process, benefits of medications, instructions for management of acute flare-ups, and follow up plans.        LOYD Gruber  Electronically signed

## 2025-07-08 ENCOUNTER — RESULTS FOLLOW-UP (OUTPATIENT)
Dept: SLEEP MEDICINE | Age: 75
End: 2025-07-08

## 2025-07-08 DIAGNOSIS — R79.0 LOW FERRITIN: Primary | ICD-10-CM

## 2025-07-08 RX ORDER — FERROUS SULFATE 325(65) MG
325 TABLET, DELAYED RELEASE (ENTERIC COATED) ORAL
Qty: 60 TABLET | Refills: 0 | Status: SHIPPED | OUTPATIENT
Start: 2025-07-08 | End: 2025-09-06

## 2025-07-08 NOTE — TELEPHONE ENCOUNTER
Spoke with patient regarding borderline iron studies.  Due to symptoms and the patient history of low iron, we will do a trial of the 60-day iron supplementation.  Patient is agreeable.  Take ferrous sulfate 325 mg one table daily for 2 months. Take ferrous sulfate with Vit C 100 mg to aid in absorption. Ferrous sulfate can cause constipation and a stool softener may be needed.  Patient verbalized understanding and was appreciative for the call.    Orders Placed This Encounter    ferrous sulfate (FE TABS 325) 325 (65 Fe) MG EC tablet     Sig: Take 1 tablet by mouth daily (with breakfast)     Dispense:  60 tablet     Refill:  0

## 2025-07-24 ENCOUNTER — CLINICAL DOCUMENTATION (OUTPATIENT)
Dept: INTERNAL MEDICINE CLINIC | Facility: CLINIC | Age: 75
End: 2025-07-24

## 2025-07-28 ENCOUNTER — OFFICE VISIT (OUTPATIENT)
Dept: ORTHOPEDIC SURGERY | Age: 75
End: 2025-07-28
Payer: MEDICARE

## 2025-07-28 DIAGNOSIS — Z96.651 S/P TOTAL KNEE ARTHROPLASTY, RIGHT: Primary | ICD-10-CM

## 2025-07-28 PROCEDURE — 3017F COLORECTAL CA SCREEN DOC REV: CPT | Performed by: PHYSICIAN ASSISTANT

## 2025-07-28 PROCEDURE — 99213 OFFICE O/P EST LOW 20 MIN: CPT | Performed by: PHYSICIAN ASSISTANT

## 2025-07-28 PROCEDURE — G8428 CUR MEDS NOT DOCUMENT: HCPCS | Performed by: PHYSICIAN ASSISTANT

## 2025-07-28 PROCEDURE — 1123F ACP DISCUSS/DSCN MKR DOCD: CPT | Performed by: PHYSICIAN ASSISTANT

## 2025-07-28 PROCEDURE — 1036F TOBACCO NON-USER: CPT | Performed by: PHYSICIAN ASSISTANT

## 2025-07-28 PROCEDURE — G8417 CALC BMI ABV UP PARAM F/U: HCPCS | Performed by: PHYSICIAN ASSISTANT

## 2025-07-28 NOTE — PROGRESS NOTES
Name: Brent Lamas Jr.  YOB: 1950  Gender: male  MRN: [id number]      Current Outpatient Medications:     ferrous sulfate (FE TABS 325) 325 (65 Fe) MG EC tablet, Take 1 tablet by mouth daily (with breakfast), Disp: 60 tablet, Rfl: 0    magnesium gluconate (MAGONATE) 500 MG tablet, Take 300 mg by mouth 2 times daily, Disp: , Rfl:     Testosterone (ANDROGEL) 20.25 MG/ACT (1.62%) GEL gel, APPLY 2 DEPRESSIONS TO UPPER ARM IN THE MORNING BEFORE 9 AM, Disp: 75 g, Rfl: 5    sertraline (ZOLOFT) 100 MG tablet, Take 1 tablet by mouth daily, Disp: 90 tablet, Rfl: 3    ibuprofen (ADVIL;MOTRIN) 600 MG tablet, Take 1 tablet by mouth every 8 hours as needed for Pain Take with food, Disp: 120 tablet, Rfl: 1    acetaminophen (TYLENOL) 500 MG tablet, Take 2 tablets by mouth every 6 hours as needed for Pain, Disp: , Rfl:     aspirin 81 MG EC tablet, Take 1 tablet by mouth 2 times daily, Disp: 70 tablet, Rfl: 0    simvastatin (ZOCOR) 40 MG tablet, Take 1 tablet by mouth nightly, Disp: 90 tablet, Rfl: 3    metoprolol succinate (TOPROL XL) 100 MG extended release tablet, Take 1 tablet by mouth daily, Disp: 90 tablet, Rfl: 3    Multiple Vitamin (MULTIVITAMIN ADULT PO), Take 1 tablet by mouth daily, Disp: , Rfl:   No Known Allergies    CC: Post-op right TKA    HPI: The patient is here now 6 months post right total knee arthroplasty.  They report that they are functioning well and denies knee pain, swelling, or instability.  They report occasional soreness.  He is able to walk 18 holes of golf twice a week with minimal stiffness by the end.  He reports that he bumped his right knee on a protruding rock about 1 week ago which resulted in minor abrasion.  No other new complaints.    PMH: Reviewed and unchanged    ROS:  As per HPI; pertinent positives and negatives are addressed with the patient, particularly, those related to musculoskeletal concerns.  Non-orthopaedic concerns were referred back to the primary care

## 2025-08-11 ENCOUNTER — OFFICE VISIT (OUTPATIENT)
Dept: INTERNAL MEDICINE CLINIC | Facility: CLINIC | Age: 75
End: 2025-08-11
Payer: MEDICARE

## 2025-08-11 VITALS
WEIGHT: 202.6 LBS | BODY MASS INDEX: 26.85 KG/M2 | DIASTOLIC BLOOD PRESSURE: 66 MMHG | OXYGEN SATURATION: 97 % | RESPIRATION RATE: 16 BRPM | HEIGHT: 73 IN | SYSTOLIC BLOOD PRESSURE: 125 MMHG | TEMPERATURE: 97.2 F | HEART RATE: 60 BPM

## 2025-08-11 DIAGNOSIS — I25.10 CORONARY ARTERY DISEASE DUE TO LIPID RICH PLAQUE: ICD-10-CM

## 2025-08-11 DIAGNOSIS — I10 ESSENTIAL HYPERTENSION: ICD-10-CM

## 2025-08-11 DIAGNOSIS — R79.89 LOW TESTOSTERONE: ICD-10-CM

## 2025-08-11 DIAGNOSIS — Z12.5 SCREENING FOR PROSTATE CANCER: ICD-10-CM

## 2025-08-11 DIAGNOSIS — I10 ESSENTIAL HYPERTENSION: Primary | ICD-10-CM

## 2025-08-11 DIAGNOSIS — I25.83 CORONARY ARTERY DISEASE DUE TO LIPID RICH PLAQUE: ICD-10-CM

## 2025-08-11 DIAGNOSIS — G47.33 OSA (OBSTRUCTIVE SLEEP APNEA): ICD-10-CM

## 2025-08-11 DIAGNOSIS — F33.2 SEVERE EPISODE OF RECURRENT MAJOR DEPRESSIVE DISORDER, WITHOUT PSYCHOTIC FEATURES (HCC): ICD-10-CM

## 2025-08-11 LAB
CHOLEST SERPL-MCNC: 174 MG/DL (ref 0–200)
HDLC SERPL-MCNC: 44 MG/DL (ref 40–60)
HDLC SERPL: 4 (ref 0–5)
LDLC SERPL CALC-MCNC: 84 MG/DL (ref 0–100)
PSA SERPL-MCNC: 0.6 NG/ML (ref 0–4)
TRIGL SERPL-MCNC: 230 MG/DL (ref 0–150)
VLDLC SERPL CALC-MCNC: 46 MG/DL (ref 6–23)

## 2025-08-11 PROCEDURE — 99214 OFFICE O/P EST MOD 30 MIN: CPT | Performed by: INTERNAL MEDICINE

## 2025-08-11 PROCEDURE — G8417 CALC BMI ABV UP PARAM F/U: HCPCS | Performed by: INTERNAL MEDICINE

## 2025-08-11 PROCEDURE — G8427 DOCREV CUR MEDS BY ELIG CLIN: HCPCS | Performed by: INTERNAL MEDICINE

## 2025-08-11 PROCEDURE — 1160F RVW MEDS BY RX/DR IN RCRD: CPT | Performed by: INTERNAL MEDICINE

## 2025-08-11 PROCEDURE — 3078F DIAST BP <80 MM HG: CPT | Performed by: INTERNAL MEDICINE

## 2025-08-11 PROCEDURE — 1159F MED LIST DOCD IN RCRD: CPT | Performed by: INTERNAL MEDICINE

## 2025-08-11 PROCEDURE — 1123F ACP DISCUSS/DSCN MKR DOCD: CPT | Performed by: INTERNAL MEDICINE

## 2025-08-11 PROCEDURE — 1036F TOBACCO NON-USER: CPT | Performed by: INTERNAL MEDICINE

## 2025-08-11 PROCEDURE — 3017F COLORECTAL CA SCREEN DOC REV: CPT | Performed by: INTERNAL MEDICINE

## 2025-08-11 PROCEDURE — 3074F SYST BP LT 130 MM HG: CPT | Performed by: INTERNAL MEDICINE

## 2025-08-11 PROCEDURE — G2211 COMPLEX E/M VISIT ADD ON: HCPCS | Performed by: INTERNAL MEDICINE

## 2025-08-11 PROCEDURE — 1126F AMNT PAIN NOTED NONE PRSNT: CPT | Performed by: INTERNAL MEDICINE

## 2025-08-11 RX ORDER — MULTIVIT WITH MINERALS/LUTEIN
250 TABLET ORAL DAILY
COMMUNITY

## (undated) DEVICE — KIT TRK KNEE PROC VIZADISC

## (undated) DEVICE — KENDALL RADIOLUCENT FOAM MONITORING ELECTRODE RECTANGULAR SHAPE: Brand: KENDALL

## (undated) DEVICE — GAUZE,SPONGE,4"X4",12PLY,WOVEN,NS,LF: Brand: MEDLINE

## (undated) DEVICE — KIT DRP FOR RIO ROBOTIC ARM ASST SYS

## (undated) DEVICE — DUAL CUT SAGITTAL BLADE

## (undated) DEVICE — BLADE SURG SAW STD S STL OSC W/ SERR EDGE DISP

## (undated) DEVICE — SYRINGE MEDICAL 3ML CLEAR PLASTIC STANDARD NON CONTROL LUERLOCK TIP DISPOSABLE

## (undated) DEVICE — SOLUTION IRRIG 3000ML 0.9% SOD CHL USP UROMATIC PLAS CONT

## (undated) DEVICE — SUTURE VICRYL SZ 1 L36IN ABSRB UD CTX L48MM 1/2 CIR J977H

## (undated) DEVICE — GLOVE ORANGE PI 7 1/2   MSG9075

## (undated) DEVICE — SOLUTION IV 250ML 0.9% SOD CHL PH 5 INJ USP VIAFLX PLAS

## (undated) DEVICE — CONTAINER FORMALIN PREFILLED 10% NBF 60ML

## (undated) DEVICE — GLOVE SURG SZ 65 THK91MIL LTX FREE SYN POLYISOPRENE

## (undated) DEVICE — KIT INT FIX FEM TIB CKPT MAKOPLASTY

## (undated) DEVICE — CONNECTOR TBNG OD5-7MM O2 END DISP

## (undated) DEVICE — TOTAL KNEE: Brand: MEDLINE INDUSTRIES, INC.

## (undated) DEVICE — SOLUTION IRRIG 1000ML 0.9% SOD CHL USP POUR PLAS BTL

## (undated) DEVICE — PIN BNE FIX TEMP L140MM DIA4MM MAKO

## (undated) DEVICE — AIRLIFE™ OXYGEN TUBING 7 FEET (2.1 M) CRUSH RESISTANT OXYGEN TUBING, VINYL TIPPED: Brand: AIRLIFE™

## (undated) DEVICE — GLOVE SURG SZ 8 L12IN FNGR THK79MIL GRN LTX FREE

## (undated) DEVICE — PIN BNE FIX TEMP L110MM DIA4MM MAKO

## (undated) DEVICE — SOLUTION IRRIG 1000ML STRL H2O USP PLAS POUR BTL

## (undated) DEVICE — STERILE PRESSURE PROTECTOR PAD® FOR DE MAYO UNIVERSAL DISTRACTOR® (10/CASE): Brand: DE MAYO UNIVERSAL DISTRACTOR®

## (undated) DEVICE — LUBE JELLY FOIL PACK 1.4 OZ: Brand: MEDLINE INDUSTRIES, INC.

## (undated) DEVICE — SYRINGE, LUER SLIP, STERILE, 60ML: Brand: MEDLINE

## (undated) DEVICE — SUTURE ABS ANTIBACT 1-0 CTX 24IN STRATAFIX PDS+ SXPP1A445

## (undated) DEVICE — SNARE POLYP SM W13MMXL240CM SHTH DIA2.4MM OVL FLX DISP

## (undated) DEVICE — BIPOLAR SEALER 23-112-1 AQM 6.0: Brand: AQUAMANTYS ®

## (undated) DEVICE — GLOVE SURG SZ 7 L12IN FNGR THK79MIL GRN LTX FREE

## (undated) DEVICE — NEEDLE HYPO 21GA L1.5IN INTRAMUSCULAR S STL LATCH BVL UP

## (undated) DEVICE — CANNULA NSL ORAL AD FOR CAPNOFLEX CO2 O2 AIRLFE

## (undated) DEVICE — SYRINGE MED 10ML LUERLOCK TIP W/O SFTY DISP

## (undated) DEVICE — SINGLE PORT MANIFOLD: Brand: NEPTUNE 2

## (undated) DEVICE — TRAP SPEC POLYP REM STRNR CLN DSGN MAGNIFYING WIND DISP

## (undated) DEVICE — STERILE PVP: Brand: MEDLINE INDUSTRIES, INC.

## (undated) DEVICE — SUTURE MONOCRYL STRATAFIX SPRL + SZ 2-0 L18IN ABSRB UD CT-1 SXMP1B413

## (undated) DEVICE — TIBIAL BEARING INSERT - CS
Type: IMPLANTABLE DEVICE | Site: KNEE | Status: NON-FUNCTIONAL
Brand: TRIATHLON
Removed: 2025-01-30

## (undated) DEVICE — SUTURE MONOCRYL SZ 2-0 L27IN ABSRB UD CP-1 1 L36MM 1/2 CIR REV Y266H

## (undated) DEVICE — GLOVE SURG SZ 9 THK91MIL LTX FREE SYN POLYISOPRENE ANTI

## (undated) DEVICE — ENDOSCOPIC KIT 1.1+ OP4 CA DE 2 GWN AAMI LEVEL 3

## (undated) DEVICE — NEEDLE SYR 18GA L1.5IN RED PLAS HUB S STL BLNT FILL W/O

## (undated) DEVICE — HOOD: Brand: T7PLUS